# Patient Record
Sex: FEMALE | Race: WHITE | Employment: OTHER | URBAN - METROPOLITAN AREA
[De-identification: names, ages, dates, MRNs, and addresses within clinical notes are randomized per-mention and may not be internally consistent; named-entity substitution may affect disease eponyms.]

---

## 2017-11-14 ENCOUNTER — APPOINTMENT (OUTPATIENT)
Dept: LAB | Facility: CLINIC | Age: 67
End: 2017-11-14
Attending: INTERNAL MEDICINE
Payer: MEDICARE

## 2017-11-14 ENCOUNTER — ONCOLOGY VISIT (OUTPATIENT)
Dept: TRANSPLANT | Facility: CLINIC | Age: 67
End: 2017-11-14
Attending: INTERNAL MEDICINE
Payer: MEDICARE

## 2017-11-14 VITALS
DIASTOLIC BLOOD PRESSURE: 85 MMHG | RESPIRATION RATE: 16 BRPM | HEIGHT: 65 IN | SYSTOLIC BLOOD PRESSURE: 135 MMHG | WEIGHT: 145.8 LBS | TEMPERATURE: 98.2 F | OXYGEN SATURATION: 98 % | BODY MASS INDEX: 24.29 KG/M2 | HEART RATE: 97 BPM

## 2017-11-14 DIAGNOSIS — E03.8 OTHER SPECIFIED HYPOTHYROIDISM: ICD-10-CM

## 2017-11-14 DIAGNOSIS — D80.1 HYPOGAMMAGLOBULINEMIA (H): ICD-10-CM

## 2017-11-14 DIAGNOSIS — C85.99 EXTRANODAL LYMPHOMA (H): ICD-10-CM

## 2017-11-14 DIAGNOSIS — Z94.81 S/P BONE MARROW TRANSPLANT (H): ICD-10-CM

## 2017-11-14 LAB
BASOPHILS # BLD AUTO: 0 10E9/L (ref 0–0.2)
BASOPHILS NFR BLD AUTO: 0.5 %
CRP SERPL-MCNC: 11.6 MG/L (ref 0–8)
DIFFERENTIAL METHOD BLD: NORMAL
EOSINOPHIL # BLD AUTO: 0.1 10E9/L (ref 0–0.7)
EOSINOPHIL NFR BLD AUTO: 1.7 %
ERYTHROCYTE [DISTWIDTH] IN BLOOD BY AUTOMATED COUNT: 13.1 % (ref 10–15)
HCT VFR BLD AUTO: 41.6 % (ref 35–47)
HGB BLD-MCNC: 13.4 G/DL (ref 11.7–15.7)
IGA SERPL-MCNC: <7 MG/DL (ref 70–380)
IGG SERPL-MCNC: 716 MG/DL (ref 695–1620)
IGM SERPL-MCNC: 133 MG/DL (ref 60–265)
IMM GRANULOCYTES # BLD: 0 10E9/L (ref 0–0.4)
IMM GRANULOCYTES NFR BLD: 0.5 %
LDH SERPL L TO P-CCNC: 181 U/L (ref 81–234)
LYMPHOCYTES # BLD AUTO: 2.2 10E9/L (ref 0.8–5.3)
LYMPHOCYTES NFR BLD AUTO: 29.7 %
MCH RBC QN AUTO: 30 PG (ref 26.5–33)
MCHC RBC AUTO-ENTMCNC: 32.2 G/DL (ref 31.5–36.5)
MCV RBC AUTO: 93 FL (ref 78–100)
MONOCYTES # BLD AUTO: 0.5 10E9/L (ref 0–1.3)
MONOCYTES NFR BLD AUTO: 6.5 %
NEUTROPHILS # BLD AUTO: 4.6 10E9/L (ref 1.6–8.3)
NEUTROPHILS NFR BLD AUTO: 61.1 %
NRBC # BLD AUTO: 0 10*3/UL
NRBC BLD AUTO-RTO: 0 /100
PLATELET # BLD AUTO: 251 10E9/L (ref 150–450)
RBC # BLD AUTO: 4.46 10E12/L (ref 3.8–5.2)
TSH SERPL DL<=0.005 MIU/L-ACNC: 1.67 MU/L (ref 0.4–4)
WBC # BLD AUTO: 7.5 10E9/L (ref 4–11)

## 2017-11-14 PROCEDURE — 82784 ASSAY IGA/IGD/IGG/IGM EACH: CPT | Performed by: INTERNAL MEDICINE

## 2017-11-14 PROCEDURE — 86140 C-REACTIVE PROTEIN: CPT | Performed by: INTERNAL MEDICINE

## 2017-11-14 PROCEDURE — 84165 PROTEIN E-PHORESIS SERUM: CPT | Performed by: INTERNAL MEDICINE

## 2017-11-14 PROCEDURE — 99212 OFFICE O/P EST SF 10 MIN: CPT | Mod: ZF

## 2017-11-14 PROCEDURE — 36415 COLL VENOUS BLD VENIPUNCTURE: CPT

## 2017-11-14 PROCEDURE — 83615 LACTATE (LD) (LDH) ENZYME: CPT | Performed by: INTERNAL MEDICINE

## 2017-11-14 PROCEDURE — 84443 ASSAY THYROID STIM HORMONE: CPT | Performed by: INTERNAL MEDICINE

## 2017-11-14 PROCEDURE — 85025 COMPLETE CBC W/AUTO DIFF WBC: CPT | Performed by: INTERNAL MEDICINE

## 2017-11-14 PROCEDURE — 00000402 ZZHCL STATISTIC TOTAL PROTEIN: Performed by: INTERNAL MEDICINE

## 2017-11-14 RX ORDER — LORAZEPAM 0.5 MG/1
0.5 TABLET ORAL EVERY 6 HOURS PRN
Qty: 30 TABLET | Refills: 0 | Status: SHIPPED | OUTPATIENT
Start: 2017-11-14 | End: 2018-11-13

## 2017-11-14 RX ORDER — LEVOTHYROXINE SODIUM 75 UG/1
75 TABLET ORAL
COMMUNITY
Start: 2017-02-04 | End: 2018-02-04

## 2017-11-14 RX ORDER — ZOLEDRONIC ACID 5 MG/100ML
5 INJECTION, SOLUTION INTRAVENOUS
COMMUNITY
Start: 2017-02-03

## 2017-11-14 ASSESSMENT — PAIN SCALES - GENERAL: PAINLEVEL: NO PAIN (0)

## 2017-11-14 NOTE — LETTER
"11/14/2017       RE: Marisa Grimes  [address] [facility] [id number]     Dear Colleague,    Thank you for referring your patient, Marisa Grimes, to the [facility] BLOOD AND MARROW TRANSPLANT at [facility]. Please see a copy of my visit note below.    BONE MARROW TRANSPLANT VISIT      Marisa returns to the [facility]plant Clinic for evaluation of diffuse large B-cell lymphoma, now 9-1/2 years following an autologous peripheral blood stem cell transplant.  She is doing very well.  Has had fewer sinus problems.Very few infections. Recently had URI after flying from [place]. Now after 2 weeks sx are improving Les sinus congestion less greenish material.Feel tired today  Did get a flu shot this fall Continues on levothyroxine 75mcg/d  Did start Reclast last February and takes Vit D/Calcium Has some arthritis in right foot  Has noted less stamina  And more easily fatigued  She did have YAG  Laser right ey post cataract surgery    PAST MEDICAL HISTORY:  See my note from 11/2016 .      SOCIAL HISTORY:  See my note from 11/2016.      FAMILY HISTORY:  See my note from 11/2016.      REVIEW OF SYSTEMS:  A 12-point review of systems is done and is negative, except as in the HPI.      PHYSICAL EXAMINATION:   GENERAL:  She is an alert woman in no acute distress.   VITAL SIGNS:  Stable. /85  Pulse 97  Temp 98.2  F (36.8  C) (Oral)  Resp 16  Ht 1.651 m (5' 5\")  Wt 66.1 kg (145 lb 12.8 oz)  SpO2 98%  BMI 24.26 kg/m2    HEENT:  Normocephalic.  EOM okay, no scleral icterus.  Mouth without lesion.  No cataracts  RESPIRATORY:  Clear to percussion and auscultation.   LYMPH:  No cervical, inguinal or axillary adenopathy noted.   CARDIAC:  Normal sinus rhythm, no S3, S4 or murmur.   ABDOMEN:  Soft without hepatosplenomegaly.   EXTREMITIES:  Without edema.   NEUROLOGIC:  Decreased reflexes in the knees.     Results for MARISA GRIMES (MRN [id number]) as of " 11/14/2017 17:39   Ref. Range 11/14/2017 10:59   CRP Inflammation Latest Ref Range: 0.0 - 8.0 mg/L 11.6 (H)   Lactate Dehydrogenase Latest Ref Range: 81 - 234 U/L 181   TSH Latest Ref Range: 0.40 - 4.00 mU/L 1.67   WBC Latest Ref Range: 4.0 - 11.0 10e9/L 7.5   Hemoglobin Latest Ref Range: 11.7 - 15.7 g/dL 13.4   Hematocrit Latest Ref Range: 35.0 - 47.0 % 41.6   Platelet Count Latest Ref Range: 150 - 450 10e9/L 251   RBC Count Latest Ref Range: 3.8 - 5.2 10e12/L 4.46   MCV Latest Ref Range: 78 - 100 fl 93   MCH Latest Ref Range: 26.5 - 33.0 pg 30.0   MCHC Latest Ref Range: 31.5 - 36.5 g/dL 32.2   RDW Latest Ref Range: 10.0 - 15.0 % 13.1   Diff Method Unknown Automated Method   % Neutrophils Latest Units: % 61.1   % Lymphocytes Latest Units: % 29.7   % Monocytes Latest Units: % 6.5   % Eosinophils Latest Units: % 1.7   % Basophils Latest Units: % 0.5   % Immature Granulocytes Latest Units: % 0.5   Nucleated RBCs Latest Ref Range: 0 /100 0   Absolute Neutrophil Latest Ref Range: 1.6 - 8.3 10e9/L 4.6   Absolute Lymphocytes Latest Ref Range: 0.8 - 5.3 10e9/L 2.2   Absolute Monocytes Latest Ref Range: 0.0 - 1.3 10e9/L 0.5   Absolute Eosinophils Latest Ref Range: 0.0 - 0.7 10e9/L 0.1   Absolute Basophils Latest Ref Range: 0.0 - 0.2 10e9/L 0.0   Abs Immature Granulocytes Latest Ref Range: 0 - 0.4 10e9/L 0.0   Absolute Nucleated RBC Unknown 0.0   Albumin Fraction Latest Ref Range: 3.7 - 5.1 g/dL PENDING   Alpha 1 Fraction Latest Ref Range: 0.2 - 0.4 g/dL PENDING   Alpha 2 Fraction Latest Ref Range: 0.5 - 0.9 g/dL PENDING   Beta Fraction Latest Ref Range: 0.6 - 1.0 g/dL PENDING   ELP Interpretation: Unknown PENDING   Gamma Fraction Latest Ref Range: 0.7 - 1.6 g/dL PENDING   IGA Latest Ref Range: 70 - 380 mg/dL <7 (L)   IGG Latest Ref Range: 695 - 1620 mg/dL 716   IGM Latest Ref Range: 60 - 265 mg/dL 133   Monoclonal Peak Latest Ref Range: 0.0 g/dL PENDING       ASSESSMENT:   1.  Diffuse large B-cell non-Hodgkin's lymphoma.    2.  Status post autologous peripheral blood stem cell transplant.   3.  Status post spine surgery.   4.  Status post radiation.   5.  Chronic sinusitis.     6.  Bilateral cataracts s/p surgery  7.  Hypogammaglobulinemia  8   Hypothyroidism     Marisa is doing well 9-1/2 years following an autologous peripheral blood stem cell transplant and remains in complete remission and Karnofsky 100. Her sinusitus is better now and last year's IgG was 598, this year 719 .No need for IgG replacement now. TSH OK at 1.61  . She did get a flu shot this fall. Glad she is on bisphosphonates, would repeat reclast annually. .  I will see her again in 1 year's time.  Again, I suggested if she should have a sinus infection that early antibiotics be used.         Again, thank you for allowing me to participate in the care of your patient.      Sincerely,    Jeff Brantley MD

## 2017-11-14 NOTE — PROGRESS NOTES
"BONE MARROW TRANSPLANT VISIT      Marisa returns to the [facility] Mar[facility] Transplant Clinic for evaluation of diffuse large B-cell lymphoma, now 9-1/2 years following an autologous peripheral blood stem cell transplant.  She is doing very well.  Has had fewer sinus problems.Very few infections. Recently had URI after flying from [place]. Now after 2 weeks sx are improving Les sinus congestion less greenish material.Feel tired today  Did get a flu shot this fall Continues on levothyroxine 75mcg/d  Did start Reclast last February and takes Vit D/Calcium Has some arthritis in right foot  Has noted less stamina  And more easily fatigued  She did have YAG  Laser right ey post cataract surgery    PAST MEDICAL HISTORY:  See my note from 11/2016 .      SOCIAL HISTORY:  See my note from 11/2016.      FAMILY HISTORY:  See my note from 11/2016.      REVIEW OF SYSTEMS:  A 12-point review of systems is done and is negative, except as in the HPI.      PHYSICAL EXAMINATION:   GENERAL:  She is an alert woman in no acute distress.   VITAL SIGNS:  Stable. /85  Pulse 97  Temp 98.2  F (36.8  C) (Oral)  Resp 16  Ht 1.651 m (5' 5\")  Wt 66.1 kg (145 lb 12.8 oz)  SpO2 98%  BMI 24.26 kg/m2    HEENT:  Normocephalic.  EOM okay, no scleral icterus.  Mouth without lesion.  No cataracts  RESPIRATORY:  Clear to percussion and auscultation.   LYMPH:  No cervical, inguinal or axillary adenopathy noted.   CARDIAC:  Normal sinus rhythm, no S3, S4 or murmur.   ABDOMEN:  Soft without hepatosplenomegaly.   EXTREMITIES:  Without edema.   NEUROLOGIC:  Decreased reflexes in the knees.     Results for MARISA GRIMES (MRN [id number]) as of 11/14/2017 17:39   Ref. Range 11/14/2017 10:59   CRP Inflammation Latest Ref Range: 0.0 - 8.0 mg/L 11.6 (H)   Lactate Dehydrogenase Latest Ref Range: 81 - 234 U/L 181   TSH Latest Ref Range: 0.40 - 4.00 mU/L 1.67   WBC Latest Ref Range: 4.0 - 11.0 10e9/L 7.5   Hemoglobin Latest Ref Range: 11.7 - 15.7 g/dL 13.4 "   Hematocrit Latest Ref Range: 35.0 - 47.0 % 41.6   Platelet Count Latest Ref Range: 150 - 450 10e9/L 251   RBC Count Latest Ref Range: 3.8 - 5.2 10e12/L 4.46   MCV Latest Ref Range: 78 - 100 fl 93   MCH Latest Ref Range: 26.5 - 33.0 pg 30.0   MCHC Latest Ref Range: 31.5 - 36.5 g/dL 32.2   RDW Latest Ref Range: 10.0 - 15.0 % 13.1   Diff Method Unknown Automated Method   % Neutrophils Latest Units: % 61.1   % Lymphocytes Latest Units: % 29.7   % Monocytes Latest Units: % 6.5   % Eosinophils Latest Units: % 1.7   % Basophils Latest Units: % 0.5   % Immature Granulocytes Latest Units: % 0.5   Nucleated RBCs Latest Ref Range: 0 /100 0   Absolute Neutrophil Latest Ref Range: 1.6 - 8.3 10e9/L 4.6   Absolute Lymphocytes Latest Ref Range: 0.8 - 5.3 10e9/L 2.2   Absolute Monocytes Latest Ref Range: 0.0 - 1.3 10e9/L 0.5   Absolute Eosinophils Latest Ref Range: 0.0 - 0.7 10e9/L 0.1   Absolute Basophils Latest Ref Range: 0.0 - 0.2 10e9/L 0.0   Abs Immature Granulocytes Latest Ref Range: 0 - 0.4 10e9/L 0.0   Absolute Nucleated RBC Unknown 0.0   Albumin Fraction Latest Ref Range: 3.7 - 5.1 g/dL PENDING   Alpha 1 Fraction Latest Ref Range: 0.2 - 0.4 g/dL PENDING   Alpha 2 Fraction Latest Ref Range: 0.5 - 0.9 g/dL PENDING   Beta Fraction Latest Ref Range: 0.6 - 1.0 g/dL PENDING   ELP Interpretation: Unknown PENDING   Gamma Fraction Latest Ref Range: 0.7 - 1.6 g/dL PENDING   IGA Latest Ref Range: 70 - 380 mg/dL <7 (L)   IGG Latest Ref Range: 695 - 1620 mg/dL 716   IGM Latest Ref Range: 60 - 265 mg/dL 133   Monoclonal Peak Latest Ref Range: 0.0 g/dL PENDING       ASSESSMENT:   1.  Diffuse large B-cell non-Hodgkin's lymphoma.   2.  Status post autologous peripheral blood stem cell transplant.   3.  Status post spine surgery.   4.  Status post radiation.   5.  Chronic sinusitis.     6.  Bilateral cataracts s/p surgery  7.  Hypogammaglobulinemia  8   Hypothyroidism     Marisa is doing well 9-1/2 years following an autologous peripheral  blood stem cell transplant and remains in complete remission and Karnofsky 100. Her sinusitus is better now and last year's IgG was 598, this year 719 .No need for IgG replacement now. TSH OK at 1.61  . She did get a flu shot this fall. Glad she is on bisphosphonates, would repeat reclast annually. .  I will see her again in 1 year's time.  Again, I suggested if she should have a sinus infection that early antibiotics be used.

## 2017-11-14 NOTE — NURSING NOTE
"Oncology Rooming Note    November 14, 2017 11:31 AM   Marisa Byrd is a 67 year old female who presents for:    Chief Complaint   Patient presents with     Blood Draw     Labs drawn from left arm in lab by MA     Oncology Clinic Visit     Patient with Extranodal Lymphoma here for provider visit and lab review      Initial Vitals: /85  Pulse 97  Temp 98.2  F (36.8  C) (Oral)  Resp 16  Ht 1.651 m (5' 5\")  Wt 66.1 kg (145 lb 12.8 oz)  SpO2 98%  BMI 24.26 kg/m2 Estimated body mass index is 24.26 kg/(m^2) as calculated from the following:    Height as of this encounter: 1.651 m (5' 5\").    Weight as of this encounter: 66.1 kg (145 lb 12.8 oz). Body surface area is 1.74 meters squared.  No Pain (0) Comment: Data Unavailable   No LMP recorded. Patient is postmenopausal.  Allergies reviewed: Yes  Medications reviewed: Yes    Medications: Medication refills not needed today.  Pharmacy name entered into [employer]: [id number] - [place], [place] - [address]    Clinical concerns:     5 minutes for nursing intake (face to face time)     Kae Lynn CMA              "

## 2017-11-14 NOTE — MR AVS SNAPSHOT
After Visit Summary   11/14/2017    Marisa Byrd    MRN: [id number]           Patient Information     Date Of Birth          1950        Visit Information        Provider Department      11/14/2017 11:30 AM Jeff Brantley MD [facility] Blood and Marrow Transplant        Today's Diagnoses     Extranodal lymphoma (H)        Hypogammaglobulinemia (H)        S/P bone marrow transplant (H)        Other specified hypothyroidism              [facility] [place] [place]  [address] [place] [address]  Phone: [phone]  Clinic Hours:   Monday-Thursday:7am to 7pm   Friday: 7am to 5pm   Weekends and holidays:    8am to noon (in general)  If your fever is 100.5  or greater,   call the clinic.  After hours call the   hospital at [phone] or   [phone]. Ask for the BMT   fellow on-call            Follow-ups after your visit        Follow-up notes from your care team     Return in about 1 year (around 11/14/2018).      Your next 10 appointments already scheduled     Nov 13, 2018 11:00 AM CST   [facility] with  [facility]   [facility] ([facility])    [address] [place] [id number] [phone]            Nov 13, 2018 11:30 AM CST   Return with Jeff Brantley MD   [facility] Blood and Marrow Transplant ([facility])    [address] [place] [id number] [phone]              Future tests that were ordered for you today     Open Future Orders        Priority Expected Expires Ordered    Comprehensive metabolic panel Routine 10/14/2018 11/7/2018 11/14/2017    CBC with platelets differential Routine 10/14/2018 11/7/2018 11/14/2017    IgG Routine 10/14/2018 11/7/2018 11/14/2017    IgM Routine 10/14/2018 11/7/2018 11/14/2017    IgE Routine 10/14/2018 11/7/2018 11/14/2017    IgA Routine 10/14/2018 11/7/2018 11/14/2017    Protein  "electrophoresis Routine 10/14/2018 11/7/2018 11/14/2017    Lactate Dehydrogenase Routine 10/14/2018 11/7/2018 11/14/2017    CRP inflammation Routine 10/14/2018 11/7/2018 11/14/2017            Who to contact     If you have questions or need follow up information about today's clinic visit or your schedule please contact [facility] BLOOD AND MARROW TRANSPLANT directly at [phone].  Normal or non-critical lab and imaging results will be communicated to you by tocariohart, letter or phone within 4 business days after the clinic has received the results. If you do not hear from us within 7 days, please contact the clinic through mygall or phone. If you have a critical or abnormal lab result, we will notify you by phone as soon as possible.  Submit refill requests through mygall or call your pharmacy and they will forward the refill request to us. Please allow 3 business days for your refill to be completed.          Additional Information About Your Visit        mygall Information     mygall gives you secure access to your electronic health record. If you see a primary care provider, you can also send messages to your care team and make appointments. If you have questions, please call your primary care clinic.  If you do not have a primary care provider, please call [phone] and they will assist you.        Care EveryWhere ID     This is your Care EveryWhere ID. This could be used by other organizations to access your [facility] medical records  [id number]        Your Vitals Were     Pulse Temperature Respirations Height Pulse Oximetry BMI (Body Mass Index)    97 98.2  F (36.8  C) (Oral) 16 1.651 m (5' 5\") 98% 24.26 kg/m2       Blood Pressure from Last 3 Encounters:   11/14/17 135/85   11/15/16 126/87   11/17/15 124/82    Weight from Last 3 Encounters:   11/14/17 66.1 kg (145 lb 12.8 oz)   11/15/16 63.8 kg (140 lb 11.2 oz)   11/17/15 67 kg (147 lb 11.3 oz)              We Performed the Following     CBC with " platelets differential     CRP inflammation     IgA     IgG     IgM     Lactate Dehydrogenase     Protein electrophoresis     TSH          Where to get your medicines      Some of these will need a paper prescription and others can be bought over the counter.  Ask your nurse if you have questions.     Bring a paper prescription for each of these medications     LORazepam 0.5 MG tablet          Recent Review Flowsheet Data     BMT Recent Results Latest Ref Rng & Units 6/11/2013 1/21/2014 4/30/2014 11/18/2014 11/17/2015 11/15/2016 11/14/2017    WBC 4.0 - 11.0 10e9/L 6.4 6.5 7.9 5.4 6.1 10.1 7.5    Hemoglobin 11.7 - 15.7 g/dL 13.5 13.3 14.2 13.5 14.9 15.1 13.4    Platelet Count 150 - 450 10e9/L 164 157 183 157 172 199 251    Neutrophils (Absolute) 1.6 - 8.3 10e9/L 4.1 4.5 5.0 2.9 2.7 6.5 4.6    INR 0.86 - 1.14 - - - - - - -    Sodium 133 - 144 mmol/L 138 142 142 141 141 144 -    Potassium 3.4 - 5.3 mmol/L 3.9 4.1 4.2 4.2 4.3 4.3 -    Chloride 94 - 109 mmol/L 104 107 104 110(H) 110(H) 109 -    Glucose 70 - 99 mg/dL 95 88 91 84 96 95 -    Urea Nitrogen 7 - 30 mg/dL 18 15 14 14 19 18 -    Creatinine 0.52 - 1.04 mg/dL 1.00 0.92 1.00 0.88 1.01 0.93 -    Calcium (Total) 8.5 - 10.1 mg/dL 9.8 9.6 9.7 10.1 9.6 9.7 -    Protein (Total) 6.8 - 8.8 g/dL 7.7 6.9 7.2 7.0 7.2 6.9 -    Albumin 3.4 - 5.0 g/dL 4.3 4.0 4.2 3.9 4.2 3.7 -    Alkaline Phosphatase 40 - 150 U/L 78 85 88 87 96 82 -    AST 0 - 45 U/L 33 27 22 16 18 16 -    ALT 0 - 50 U/L 26 30 30 28 29 23 -    MCV 78 - 100 fl 91 92 92 92 90 92 93               Primary Care Provider    Physician No Ref-Primary       NO REF-PRIMARY PHYSICIAN        Equal Access to Services     ARVIN RILEY : Jessica Jhaveri, jennifer cartwright, debra ramos, sina ervin. [facility] [phone].    ATENCIÓN: Si habla español, tiene a vaughn disposición servicios gratuitos de asistencia lingüística. Llame al [phone].    We comply with applicable  federal civil rights laws and [place] laws. We do not discriminate on the basis of race, color, national origin, age, disability, sex, sexual orientation, or gender identity.            Thank you!     Thank you for choosing [facility] BLOOD AND MARROW TRANSPLANT  for your care. Our goal is always to provide you with excellent care. Hearing back from our patients is one way we can continue to improve our services. Please take a few minutes to complete the written survey that you may receive in the mail after your visit with us. Thank you!             Your Updated Medication List - Protect others around you: Learn how to safely use, store and throw away your medicines at www.disposemymeds.org.          This list is accurate as of: 11/14/17 12:17 PM.  Always use your most recent med list.                   Brand Name Dispense Instructions for use Diagnosis    albuterol 108 (90 BASE) MCG/ACT Inhaler    PROAIR HFA/PROVENTIL HFA/VENTOLIN HFA     Inhale 2 puffs into the lungs every 4 hours as needed.        aspirin 81 MG tablet      Take 1 tablet by mouth daily.        calcium citrate-vitamin D 315-200 MG-UNIT Tabs per tablet    CITRACAL     Take 1 tablet by mouth daily.        fluticasone 50 MCG/ACT spray    FLONASE     Spray 2 sprays into both nostrils as needed.        guaiFENesin-codeine 100-10 MG/5ML Soln solution    ROBITUSSIN AC    240 mL    Take 10 mLs by mouth every 4 hours as needed    S/P bone marrow transplant (H)       levothyroxine 75 MCG tablet    SYNTHROID/LEVOTHROID     Take 75 mcg by mouth    Extranodal lymphoma (H), Hypogammaglobulinemia (H), S/P bone marrow transplant (H), Other specified hypothyroidism       LORazepam 0.5 MG tablet    ATIVAN    30 tablet    Take 1 tablet (0.5 mg) by mouth every 6 hours as needed for anxiety    Extranodal lymphoma (H), Hypogammaglobulinemia (H), S/P bone marrow transplant (H), Other specified hypothyroidism       simvastatin 10 MG tablet    ZOCOR     Take 1 tablet by  mouth daily.        zoledronic Acid 5 MG/100ML Soln infusion    RECLAST     Inject 5 mg into the vein    Extranodal lymphoma (H), Hypogammaglobulinemia (H), S/P bone marrow transplant (H), Other specified hypothyroidism

## 2017-11-14 NOTE — NURSING NOTE
Chief Complaint   Patient presents with     Blood Draw     Labs drawn from left arm in lab by MA     Pt tolerated well  Diana Figueroa MA

## 2017-11-15 LAB
ALBUMIN SERPL ELPH-MCNC: 3.9 G/DL (ref 3.7–5.1)
ALPHA1 GLOB SERPL ELPH-MCNC: 0.4 G/DL (ref 0.2–0.4)
ALPHA2 GLOB SERPL ELPH-MCNC: 0.9 G/DL (ref 0.5–0.9)
B-GLOBULIN SERPL ELPH-MCNC: 0.7 G/DL (ref 0.6–1)
GAMMA GLOB SERPL ELPH-MCNC: 0.8 G/DL (ref 0.7–1.6)
M PROTEIN SERPL ELPH-MCNC: 0 G/DL
PROT PATTERN SERPL ELPH-IMP: NORMAL

## 2017-11-17 ENCOUNTER — CARE COORDINATION (OUTPATIENT)
Dept: TRANSPLANT | Facility: CLINIC | Age: 67
End: 2017-11-17

## 2017-11-17 DIAGNOSIS — Z94.81 S/P BONE MARROW TRANSPLANT (H): ICD-10-CM

## 2017-11-17 DIAGNOSIS — J32.9 SINUSITIS: Primary | ICD-10-CM

## 2017-11-17 RX ORDER — LEVOFLOXACIN 500 MG/1
500 TABLET, FILM COATED ORAL DAILY
Qty: 7 TABLET | Refills: 0 | Status: SHIPPED | OUTPATIENT
Start: 2017-11-17

## 2017-11-17 NOTE — PROGRESS NOTES
I spoke with Marisa Byrd to relay her outstanding lab results from Tuesday's appointment. In that conversation, she requested an antibiotic as she is not clearing her URI and is still experiencing sinus congestion. This information was relayed to Dr. Brantley who saw her on Tuesday and he ordered an rx for Levaquin 500mg for 7 days. Patient was called back with this information who accurately verbalized back understanding of the prescription.

## 2018-03-20 ENCOUNTER — CARE COORDINATION (OUTPATIENT)
Dept: TRANSPLANT | Facility: CLINIC | Age: 68
End: 2018-03-20

## 2018-03-20 NOTE — PROGRESS NOTES
Patient called inquiring if it was okay to get Shingrix immunization. Per Dr. Brantley, I informed patient that it was okay. Patient verbalized back understanding.

## 2018-11-13 ENCOUNTER — APPOINTMENT (OUTPATIENT)
Dept: LAB | Facility: CLINIC | Age: 68
End: 2018-11-13
Attending: INTERNAL MEDICINE
Payer: MEDICARE

## 2018-11-13 ENCOUNTER — ONCOLOGY VISIT (OUTPATIENT)
Dept: TRANSPLANT | Facility: CLINIC | Age: 68
End: 2018-11-13
Attending: INTERNAL MEDICINE
Payer: MEDICARE

## 2018-11-13 VITALS
RESPIRATION RATE: 16 BRPM | WEIGHT: 142.9 LBS | TEMPERATURE: 97.5 F | DIASTOLIC BLOOD PRESSURE: 85 MMHG | SYSTOLIC BLOOD PRESSURE: 128 MMHG | OXYGEN SATURATION: 100 % | HEART RATE: 84 BPM | HEIGHT: 65 IN | BODY MASS INDEX: 23.81 KG/M2

## 2018-11-13 DIAGNOSIS — C85.99 EXTRANODAL LYMPHOMA (H): ICD-10-CM

## 2018-11-13 DIAGNOSIS — Z94.81 S/P BONE MARROW TRANSPLANT (H): ICD-10-CM

## 2018-11-13 DIAGNOSIS — D80.1 HYPOGAMMAGLOBULINEMIA (H): ICD-10-CM

## 2018-11-13 DIAGNOSIS — E03.8 OTHER SPECIFIED HYPOTHYROIDISM: ICD-10-CM

## 2018-11-13 LAB
ALBUMIN SERPL-MCNC: 3.9 G/DL (ref 3.4–5)
ALP SERPL-CCNC: 88 U/L (ref 40–150)
ALT SERPL W P-5'-P-CCNC: 23 U/L (ref 0–50)
ANION GAP SERPL CALCULATED.3IONS-SCNC: 5 MMOL/L (ref 3–14)
AST SERPL W P-5'-P-CCNC: 11 U/L (ref 0–45)
BASOPHILS # BLD AUTO: 0 10E9/L (ref 0–0.2)
BASOPHILS NFR BLD AUTO: 0.4 %
BILIRUB SERPL-MCNC: 0.4 MG/DL (ref 0.2–1.3)
BUN SERPL-MCNC: 24 MG/DL (ref 7–30)
CALCIUM SERPL-MCNC: 9.4 MG/DL (ref 8.5–10.1)
CHLORIDE SERPL-SCNC: 108 MMOL/L (ref 94–109)
CO2 SERPL-SCNC: 26 MMOL/L (ref 20–32)
CREAT SERPL-MCNC: 1.04 MG/DL (ref 0.52–1.04)
CRP SERPL-MCNC: 10 MG/L (ref 0–8)
DIFFERENTIAL METHOD BLD: ABNORMAL
EOSINOPHIL # BLD AUTO: 0.1 10E9/L (ref 0–0.7)
EOSINOPHIL NFR BLD AUTO: 1.1 %
ERYTHROCYTE [DISTWIDTH] IN BLOOD BY AUTOMATED COUNT: 13 % (ref 10–15)
GFR SERPL CREATININE-BSD FRML MDRD: 53 ML/MIN/1.7M2
GLUCOSE SERPL-MCNC: 96 MG/DL (ref 70–99)
HCT VFR BLD AUTO: 47.2 % (ref 35–47)
HGB BLD-MCNC: 15.1 G/DL (ref 11.7–15.7)
IGA SERPL-MCNC: <7 MG/DL (ref 70–380)
IGG SERPL-MCNC: 705 MG/DL (ref 695–1620)
IGM SERPL-MCNC: 90 MG/DL (ref 60–265)
IMM GRANULOCYTES # BLD: 0 10E9/L (ref 0–0.4)
IMM GRANULOCYTES NFR BLD: 0.4 %
LDH SERPL L TO P-CCNC: 172 U/L (ref 81–234)
LYMPHOCYTES # BLD AUTO: 2 10E9/L (ref 0.8–5.3)
LYMPHOCYTES NFR BLD AUTO: 21.8 %
MCH RBC QN AUTO: 29.7 PG (ref 26.5–33)
MCHC RBC AUTO-ENTMCNC: 32 G/DL (ref 31.5–36.5)
MCV RBC AUTO: 93 FL (ref 78–100)
MONOCYTES # BLD AUTO: 0.5 10E9/L (ref 0–1.3)
MONOCYTES NFR BLD AUTO: 5.2 %
NEUTROPHILS # BLD AUTO: 6.6 10E9/L (ref 1.6–8.3)
NEUTROPHILS NFR BLD AUTO: 71.1 %
NRBC # BLD AUTO: 0 10*3/UL
NRBC BLD AUTO-RTO: 0 /100
PLATELET # BLD AUTO: 224 10E9/L (ref 150–450)
POTASSIUM SERPL-SCNC: 4 MMOL/L (ref 3.4–5.3)
PROT SERPL-MCNC: 7.3 G/DL (ref 6.8–8.8)
RBC # BLD AUTO: 5.09 10E12/L (ref 3.8–5.2)
SODIUM SERPL-SCNC: 139 MMOL/L (ref 133–144)
WBC # BLD AUTO: 9.4 10E9/L (ref 4–11)

## 2018-11-13 PROCEDURE — 00000402 ZZHCL STATISTIC TOTAL PROTEIN: Performed by: INTERNAL MEDICINE

## 2018-11-13 PROCEDURE — 82785 ASSAY OF IGE: CPT | Performed by: INTERNAL MEDICINE

## 2018-11-13 PROCEDURE — 83615 LACTATE (LD) (LDH) ENZYME: CPT | Performed by: INTERNAL MEDICINE

## 2018-11-13 PROCEDURE — 85025 COMPLETE CBC W/AUTO DIFF WBC: CPT | Performed by: INTERNAL MEDICINE

## 2018-11-13 PROCEDURE — 82784 ASSAY IGA/IGD/IGG/IGM EACH: CPT | Performed by: INTERNAL MEDICINE

## 2018-11-13 PROCEDURE — 86140 C-REACTIVE PROTEIN: CPT | Performed by: INTERNAL MEDICINE

## 2018-11-13 PROCEDURE — 80053 COMPREHEN METABOLIC PANEL: CPT | Performed by: INTERNAL MEDICINE

## 2018-11-13 PROCEDURE — G0463 HOSPITAL OUTPT CLINIC VISIT: HCPCS

## 2018-11-13 PROCEDURE — 36415 COLL VENOUS BLD VENIPUNCTURE: CPT

## 2018-11-13 PROCEDURE — G0463 HOSPITAL OUTPT CLINIC VISIT: HCPCS | Mod: ZF

## 2018-11-13 PROCEDURE — 84165 PROTEIN E-PHORESIS SERUM: CPT | Performed by: INTERNAL MEDICINE

## 2018-11-13 RX ORDER — CODEINE PHOSPHATE AND GUAIFENESIN 10; 100 MG/5ML; MG/5ML
2 SOLUTION ORAL EVERY 4 HOURS PRN
Qty: 240 ML | Refills: 0 | Status: SHIPPED | OUTPATIENT
Start: 2018-11-13

## 2018-11-13 RX ORDER — LORAZEPAM 0.5 MG/1
0.5 TABLET ORAL EVERY 6 HOURS PRN
Qty: 30 TABLET | Refills: 0 | Status: SHIPPED | OUTPATIENT
Start: 2018-11-13 | End: 2020-11-20

## 2018-11-13 RX ORDER — LEVOTHYROXINE SODIUM 75 UG/1
75 TABLET ORAL
COMMUNITY
Start: 2017-12-18 | End: 2018-12-18

## 2018-11-13 ASSESSMENT — PAIN SCALES - GENERAL: PAINLEVEL: NO PAIN (0)

## 2018-11-13 NOTE — MR AVS SNAPSHOT
After Visit Summary   11/13/2018    Marisa Byrd    MRN: [id number]           Patient Information     Date Of Birth          1950        Visit Information        Provider Department      11/13/2018 11:30 AM Jeff Brantley MD [facility] Blood and Marrow Transplant        Today's Diagnoses     Extranodal lymphoma (H)        Hypogammaglobulinemia (H)        S/P bone marrow transplant (H)        Other specified hypothyroidism              [facility] ([facility])  [address] [place] [address]  Phone: [phone]  Clinic Hours:   Monday-Thursday:7am to 7pm   Friday: 7am to 5pm   Weekends and holidays:    8am to noon (in general)  If your fever is 100.5  or greater,   call the clinic.  After hours call the   hospital at [phone] or   [phone]. Ask for the BMT   fellow on-call            Follow-ups after your visit        Follow-up notes from your care team     Return in about 1 year (around 11/13/2019).      Future tests that were ordered for you today     Open Future Orders        Priority Expected Expires Ordered    CBC with platelets differential Routine 11/13/2019 11/13/2019 11/13/2018    Comprehensive metabolic panel Routine 11/13/2019 11/13/2019 11/13/2018    TSH Routine 11/13/2019 11/13/2019 11/13/2018    IgG Routine 11/13/2019 11/13/2019 11/13/2018    CRP inflammation Routine 11/13/2019 11/13/2019 11/13/2018    Lactate Dehydrogenase Routine 11/13/2019 11/13/2019 11/13/2018    Protein electrophoresis Routine 11/13/2019 11/13/2019 11/13/2018            Who to contact     If you have questions or need follow up information about today's clinic visit or your schedule please contact [facility] directly at [phone].  Normal or non-critical lab and imaging results will be communicated to you by MyChart, letter or phone within 4 business days after the clinic has received the results. If you do not hear from us within 7 days,  "please contact the clinic through [employer] or phone. If you have a critical or abnormal lab result, we will notify you by phone as soon as possible.  Submit refill requests through [employer] or call your pharmacy and they will forward the refill request to us. Please allow 3 business days for your refill to be completed.          Additional Information About Your Visit        [email or web address] Information     [employer] gives you secure access to your electronic health record. If you see a primary care provider, you can also send messages to your care team and make appointments. If you have questions, please call your primary care clinic.  If you do not have a primary care provider, please call [phone] and they will assist you.        Care EveryWhere ID     This is your Care EveryWhere ID. This could be used by other organizations to access your [facility] medical records  [id number]        Your Vitals Were     Pulse Temperature Respirations Height Pulse Oximetry BMI (Body Mass Index)    84 97.5  F (36.4  C) (Oral) 16 1.651 m (5' 5\") 100% 23.78 kg/m2       Blood Pressure from Last 3 Encounters:   11/13/18 128/85   11/14/17 135/85   11/15/16 126/87    Weight from Last 3 Encounters:   11/13/18 64.8 kg (142 lb 14.4 oz)   11/14/17 66.1 kg (145 lb 12.8 oz)   11/15/16 63.8 kg (140 lb 11.2 oz)              We Performed the Following     CBC with platelets differential     Comprehensive metabolic panel     CRP inflammation     IgA     IgE     IgG     IgM     Lactate Dehydrogenase     Protein electrophoresis          Where to get your medicines      Some of these will need a paper prescription and others can be bought over the counter.  Ask your nurse if you have questions.     Bring a paper prescription for each of these medications     guaiFENesin-codeine 100-10 MG/5ML Soln solution    LORazepam 0.5 MG tablet          Recent Review Flowsheet Data     BMT Recent Results Latest Ref Rng & Units 1/21/2014 4/30/2014 11/18/2014 11/17/2015 " 11/15/2016 11/14/2017 11/13/2018    WBC 4.0 - 11.0 10e9/L 6.5 7.9 5.4 6.1 10.1 7.5 9.4    Hemoglobin 11.7 - 15.7 g/dL 13.3 14.2 13.5 14.9 15.1 13.4 15.1    Platelet Count 150 - 450 10e9/L 157 183 157 172 199 251 224    Neutrophils (Absolute) 1.6 - 8.3 10e9/L 4.5 5.0 2.9 2.7 6.5 4.6 6.6    INR 0.86 - 1.14 - - - - - - -    Sodium 133 - 144 mmol/L 142 142 141 141 144 - 139    Potassium 3.4 - 5.3 mmol/L 4.1 4.2 4.2 4.3 4.3 - 4.0    Chloride 94 - 109 mmol/L 107 104 110(H) 110(H) 109 - 108    Glucose 70 - 99 mg/dL 88 91 84 96 95 - 96    Urea Nitrogen 7 - 30 mg/dL 15 14 14 19 18 - 24    Creatinine 0.52 - 1.04 mg/dL 0.92 1.00 0.88 1.01 0.93 - 1.04    Calcium (Total) 8.5 - 10.1 mg/dL 9.6 9.7 10.1 9.6 9.7 - 9.4    Protein (Total) 6.8 - 8.8 g/dL 6.9 7.2 7.0 7.2 6.9 - 7.3    Albumin 3.4 - 5.0 g/dL 4.0 4.2 3.9 4.2 3.7 - 3.9    Alkaline Phosphatase 40 - 150 U/L 85 88 87 96 82 - 88    AST 0 - 45 U/L 27 22 16 18 16 - 11    ALT 0 - 50 U/L 30 30 28 29 23 - 23    MCV 78 - 100 fl 92 92 92 90 92 93 93               Primary Care Provider Fax #    Physician No Ref-Primary [phone]       No address on file        Equal Access to Services     [facility] ROSEMARY AH: Hadii steven tenorioo Soivisali, waaxda luqadaha, qaybta kaalmada adeegyada, sina ervin. So [facility] [phone].    ATENCIÓN: Si habla ana maria, tiene a vaughn disposición servicios gratuitos de asistencia lingüística. Llame al [phone].    We comply with applicable federal civil rights laws and [place] laws. We do not discriminate on the basis of race, color, national origin, age, disability, sex, sexual orientation, or gender identity.            Thank you!     Thank you for choosing [facility]OD [facility] MARROW TRANSPLANT  for your care. Our goal is always to provide you with excellent care. Hearing back from our patients is one way we can continue to improve our services. Please take a few minutes to complete the written survey that you may receive in the  mail after your visit with us. Thank you!             Your Updated Medication List - Protect others around you: Learn how to safely use, store and throw away your medicines at www.disposemymeds.org.          This list is accurate as of 11/13/18 12:26 PM.  Always use your most recent med list.                   Brand Name Dispense Instructions for use Diagnosis    albuterol 108 (90 Base) MCG/ACT inhaler    PROAIR HFA/PROVENTIL HFA/VENTOLIN HFA     Inhale 2 puffs into the lungs every 4 hours as needed.        aspirin 81 MG tablet      Take 1 tablet by mouth daily.        calcium citrate-vitamin D 315-200 MG-UNIT Tabs per tablet    CITRACAL     Take 1 tablet by mouth daily.        fluticasone 50 MCG/ACT spray    FLONASE     Spray 2 sprays into both nostrils as needed.        guaiFENesin-codeine 100-10 MG/5ML Soln solution    ROBITUSSIN AC    240 mL    Take 10 mLs by mouth every 4 hours as needed    S/P bone marrow transplant (H), Extranodal lymphoma (H), Hypogammaglobulinemia (H), Other specified hypothyroidism       levofloxacin 500 MG tablet    LEVAQUIN    7 tablet    Take 1 tablet (500 mg) by mouth daily    Sinusitis, S/P bone marrow transplant (H)       levothyroxine 75 MCG tablet    SYNTHROID/LEVOTHROID     Take 75 mcg by mouth    Extranodal lymphoma (H), Hypogammaglobulinemia (H), S/P bone marrow transplant (H), Other specified hypothyroidism       LORazepam 0.5 MG tablet    ATIVAN    30 tablet    Take 1 tablet (0.5 mg) by mouth every 6 hours as needed for anxiety    Extranodal lymphoma (H), Hypogammaglobulinemia (H), S/P bone marrow transplant (H), Other specified hypothyroidism       simvastatin 10 MG tablet    ZOCOR     Take 1 tablet by mouth daily.        zoledronic Acid 5 MG/100ML Soln infusion    RECLAST     Inject 5 mg into the vein    Extranodal lymphoma (H), Hypogammaglobulinemia (H), S/P bone marrow transplant (H), Other specified hypothyroidism

## 2018-11-13 NOTE — PROGRESS NOTES
"BONE MARROW TRANSPLANT VISIT      Marisa returns to the Bone Marrow Transplant Clinic for evaluation of diffuse large B-cell lymphoma, now 10.5 years following an autologous peripheral blood stem cell transplant.  She is doing very well.  Did get a flu shot this fall Continues on levothyroxine 75mcg/d  Did start Reclast last February 2018 and takes Vit D/Calcium Has some arthritis in right foot with considerable pain. Will plan to have surgery on the foot Has some intermittent sinus trouble and dry cough        PAST MEDICAL HISTORY:  See my note from 11/2017 .      SOCIAL HISTORY:  See my note from 11/2017.      FAMILY HISTORY:  See my note from 11/2017.      REVIEW OF SYSTEMS:  A 12-point review of systems is done and is negative, except as in the HPI.      PHYSICAL EXAMINATION:   GENERAL:  She is an alert woman in no acute distress.   VITAL SIGNS:  Stable. /85 (BP Location: Right arm, Patient Position: Sitting, Cuff Size: Adult Regular)  Pulse 84  Temp 97.5  F (36.4  C) (Oral)  Resp 16  Ht 1.651 m (5' 5\")  Wt 64.8 kg (142 lb 14.4 oz)  SpO2 100%  BMI 23.78 kg/m2    HEENT:  Normocephalic.  EOM okay, no scleral icterus.  Mouth without lesion.  No cataracts  RESPIRATORY:  Clear to percussion and auscultation.   LYMPH:  No cervical, inguinal or axillary adenopathy noted.   CARDIAC:  Normal sinus rhythm, no S3, S4 or murmur.   ABDOMEN:  Soft without hepatosplenomegaly.   EXTREMITIES:  Without edema.   NEUROLOGIC:  Decreased reflexes in the knees.   R FOOT Dorsum of foot sl swollen    Results for MARISA GRIMES (MRN [id number]) as of 11/13/2018 21:50   Ref. Range 11/13/2018 11:11   Sodium Latest Ref Range: 133 - 144 mmol/L 139   Potassium Latest Ref Range: 3.4 - 5.3 mmol/L 4.0   Chloride Latest Ref Range: 94 - 109 mmol/L 108   Carbon Dioxide Latest Ref Range: 20 - 32 mmol/L 26   Urea Nitrogen Latest Ref Range: 7 - 30 mg/dL 24   Creatinine Latest Ref Range: 0.52 - 1.04 mg/dL 1.04   GFR Estimate Latest Ref " Range: >60 mL/min/1.7m2 53 (L)   GFR Estimate If Black Latest Ref Range: >60 mL/min/1.7m2 64   Calcium Latest Ref Range: 8.5 - 10.1 mg/dL 9.4   Anion Gap Latest Ref Range: 3 - 14 mmol/L 5   Albumin Latest Ref Range: 3.4 - 5.0 g/dL 3.9   Protein Total Latest Ref Range: 6.8 - 8.8 g/dL 7.3   Bilirubin Total Latest Ref Range: 0.2 - 1.3 mg/dL 0.4   Alkaline Phosphatase Latest Ref Range: 40 - 150 U/L 88   ALT Latest Ref Range: 0 - 50 U/L 23   AST Latest Ref Range: 0 - 45 U/L 11   CRP Inflammation Latest Ref Range: 0.0 - 8.0 mg/L 10.0 (H)   Lactate Dehydrogenase Latest Ref Range: 81 - 234 U/L 172   Glucose Latest Ref Range: 70 - 99 mg/dL 96   WBC Latest Ref Range: 4.0 - 11.0 10e9/L 9.4   Hemoglobin Latest Ref Range: 11.7 - 15.7 g/dL 15.1   Hematocrit Latest Ref Range: 35.0 - 47.0 % 47.2 (H)   Platelet Count Latest Ref Range: 150 - 450 10e9/L 224   RBC Count Latest Ref Range: 3.8 - 5.2 10e12/L 5.09   MCV Latest Ref Range: 78 - 100 fl 93   MCH Latest Ref Range: 26.5 - 33.0 pg 29.7   MCHC Latest Ref Range: 31.5 - 36.5 g/dL 32.0   RDW Latest Ref Range: 10.0 - 15.0 % 13.0   Diff Method Unknown Automated Method   % Neutrophils Latest Units: % 71.1   % Lymphocytes Latest Units: % 21.8   % Monocytes Latest Units: % 5.2   % Eosinophils Latest Units: % 1.1   % Basophils Latest Units: % 0.4   % Immature Granulocytes Latest Units: % 0.4   Nucleated RBCs Latest Ref Range: 0 /100 0   Absolute Neutrophil Latest Ref Range: 1.6 - 8.3 10e9/L 6.6   Absolute Lymphocytes Latest Ref Range: 0.8 - 5.3 10e9/L 2.0   Absolute Monocytes Latest Ref Range: 0.0 - 1.3 10e9/L 0.5   Absolute Eosinophils Latest Ref Range: 0.0 - 0.7 10e9/L 0.1   Absolute Basophils Latest Ref Range: 0.0 - 0.2 10e9/L 0.0   Abs Immature Granulocytes Latest Ref Range: 0 - 0.4 10e9/L 0.0   Absolute Nucleated RBC Unknown 0.0   Albumin Fraction Latest Ref Range: 3.7 - 5.1 g/dL PENDING   Alpha 1 Fraction Latest Ref Range: 0.2 - 0.4 g/dL PENDING   Alpha 2 Fraction Latest Ref Range:  0.5 - 0.9 g/dL PENDING   Beta Fraction Latest Ref Range: 0.6 - 1.0 g/dL PENDING   ELP Interpretation: Unknown PENDING   Gamma Fraction Latest Ref Range: 0.7 - 1.6 g/dL PENDING   IGA Latest Ref Range: 70 - 380 mg/dL <7 (L)   IGG Latest Ref Range: 695 - 1620 mg/dL 705   IGM Latest Ref Range: 60 - 265 mg/dL 90   Monoclonal Peak Latest Ref Range: 0.0 g/dL PENDING       ASSESSMENT:   1.  Diffuse large B-cell non-Hodgkin's lymphoma.   2.  Status post autologous peripheral blood stem cell transplant.   3.  Status post spine surgery.   4.  Status post radiation.   5.  Chronic sinusitis.     6.  Bilateral cataracts s/p surgery  7.  Hypogammaglobulinemia  8   Hypothyroidism  9.  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 Her sinusitus is better now and last year's IgG was  719 .No need for IgG replacement now. TSH OK at 1.61  . She did get a flu shot this fall. Glad she is on bisphosphonates, would repeat reclast annuallyWould check with ortho for timing of Reclast around the surgery. OK to get Shingrix. .  Pt interested in Survivorship clinic.After foot surgery will contact Alissa Arguello to set up appt.I will see her again in 1 year's time.  Again, I suggested if she should have a sinus infection that early antibiotics be used. She has a low IgA but has received platelets and IV IgG without complications IGG levels ok

## 2018-11-13 NOTE — NURSING NOTE
Chief Complaint   Patient presents with     Blood Draw     Labs drawn via VPT by RN. VS taken. Pt checked in for next appt     Labs collected from venipuncture by RN. Vitals taken. Checked in for appointment(s).    Maria E BOND RN PHN BSN  BMT/Oncology Lab

## 2018-11-15 LAB
ALBUMIN SERPL ELPH-MCNC: 4.6 G/DL (ref 3.7–5.1)
ALPHA1 GLOB SERPL ELPH-MCNC: 0.3 G/DL (ref 0.2–0.4)
ALPHA2 GLOB SERPL ELPH-MCNC: 0.7 G/DL (ref 0.5–0.9)
B-GLOBULIN SERPL ELPH-MCNC: 0.7 G/DL (ref 0.6–1)
GAMMA GLOB SERPL ELPH-MCNC: 0.7 G/DL (ref 0.7–1.6)
IGE SERPL-ACNC: <2 KIU/L (ref 0–114)
M PROTEIN SERPL ELPH-MCNC: 0 G/DL
PROT PATTERN SERPL ELPH-IMP: NORMAL

## 2019-10-01 ENCOUNTER — HEALTH MAINTENANCE LETTER (OUTPATIENT)
Age: 69
End: 2019-10-01

## 2019-11-19 ENCOUNTER — APPOINTMENT (OUTPATIENT)
Dept: LAB | Facility: CLINIC | Age: 69
End: 2019-11-19
Attending: INTERNAL MEDICINE
Payer: MEDICARE

## 2019-11-19 ENCOUNTER — ONCOLOGY VISIT (OUTPATIENT)
Dept: TRANSPLANT | Facility: CLINIC | Age: 69
End: 2019-11-19
Attending: INTERNAL MEDICINE
Payer: MEDICARE

## 2019-11-19 VITALS
TEMPERATURE: 98.5 F | HEART RATE: 85 BPM | BODY MASS INDEX: 24.96 KG/M2 | OXYGEN SATURATION: 98 % | SYSTOLIC BLOOD PRESSURE: 140 MMHG | RESPIRATION RATE: 16 BRPM | WEIGHT: 150 LBS | DIASTOLIC BLOOD PRESSURE: 79 MMHG

## 2019-11-19 DIAGNOSIS — C85.99 EXTRANODAL LYMPHOMA (H): ICD-10-CM

## 2019-11-19 DIAGNOSIS — E03.8 OTHER SPECIFIED HYPOTHYROIDISM: ICD-10-CM

## 2019-11-19 DIAGNOSIS — Z94.81 S/P BONE MARROW TRANSPLANT (H): ICD-10-CM

## 2019-11-19 DIAGNOSIS — D80.1 HYPOGAMMAGLOBULINEMIA (H): ICD-10-CM

## 2019-11-19 LAB
ALBUMIN SERPL-MCNC: 4.1 G/DL (ref 3.4–5)
ALP SERPL-CCNC: 84 U/L (ref 40–150)
ALT SERPL W P-5'-P-CCNC: 23 U/L (ref 0–50)
ANION GAP SERPL CALCULATED.3IONS-SCNC: 4 MMOL/L (ref 3–14)
AST SERPL W P-5'-P-CCNC: 16 U/L (ref 0–45)
BASOPHILS # BLD AUTO: 0.1 10E9/L (ref 0–0.2)
BASOPHILS NFR BLD AUTO: 0.7 %
BILIRUB SERPL-MCNC: 0.4 MG/DL (ref 0.2–1.3)
BUN SERPL-MCNC: 21 MG/DL (ref 7–30)
CALCIUM SERPL-MCNC: 9.9 MG/DL (ref 8.5–10.1)
CHLORIDE SERPL-SCNC: 108 MMOL/L (ref 94–109)
CO2 SERPL-SCNC: 26 MMOL/L (ref 20–32)
CREAT SERPL-MCNC: 0.96 MG/DL (ref 0.52–1.04)
CRP SERPL-MCNC: <2.9 MG/L (ref 0–8)
DIFFERENTIAL METHOD BLD: NORMAL
EOSINOPHIL # BLD AUTO: 0.1 10E9/L (ref 0–0.7)
EOSINOPHIL NFR BLD AUTO: 1.4 %
ERYTHROCYTE [DISTWIDTH] IN BLOOD BY AUTOMATED COUNT: 13.4 % (ref 10–15)
GFR SERPL CREATININE-BSD FRML MDRD: 60 ML/MIN/{1.73_M2}
GLUCOSE SERPL-MCNC: 94 MG/DL (ref 70–99)
HCT VFR BLD AUTO: 46.6 % (ref 35–47)
HGB BLD-MCNC: 15.1 G/DL (ref 11.7–15.7)
IGG SERPL-MCNC: 646 MG/DL (ref 695–1620)
IMM GRANULOCYTES # BLD: 0 10E9/L (ref 0–0.4)
IMM GRANULOCYTES NFR BLD: 0.2 %
LDH SERPL L TO P-CCNC: 168 U/L (ref 81–234)
LYMPHOCYTES # BLD AUTO: 1.9 10E9/L (ref 0.8–5.3)
LYMPHOCYTES NFR BLD AUTO: 23.8 %
MCH RBC QN AUTO: 30.1 PG (ref 26.5–33)
MCHC RBC AUTO-ENTMCNC: 32.4 G/DL (ref 31.5–36.5)
MCV RBC AUTO: 93 FL (ref 78–100)
MONOCYTES # BLD AUTO: 0.6 10E9/L (ref 0–1.3)
MONOCYTES NFR BLD AUTO: 7.1 %
NEUTROPHILS # BLD AUTO: 5.4 10E9/L (ref 1.6–8.3)
NEUTROPHILS NFR BLD AUTO: 66.8 %
NRBC # BLD AUTO: 0 10*3/UL
NRBC BLD AUTO-RTO: 0 /100
PLATELET # BLD AUTO: 215 10E9/L (ref 150–450)
POTASSIUM SERPL-SCNC: 4.1 MMOL/L (ref 3.4–5.3)
PROT SERPL-MCNC: 7.3 G/DL (ref 6.8–8.8)
RBC # BLD AUTO: 5.01 10E12/L (ref 3.8–5.2)
SODIUM SERPL-SCNC: 139 MMOL/L (ref 133–144)
TSH SERPL DL<=0.005 MIU/L-ACNC: 0.99 MU/L (ref 0.4–4)
WBC # BLD AUTO: 8 10E9/L (ref 4–11)

## 2019-11-19 PROCEDURE — 86140 C-REACTIVE PROTEIN: CPT | Performed by: INTERNAL MEDICINE

## 2019-11-19 PROCEDURE — 85025 COMPLETE CBC W/AUTO DIFF WBC: CPT | Performed by: INTERNAL MEDICINE

## 2019-11-19 PROCEDURE — G0463 HOSPITAL OUTPT CLINIC VISIT: HCPCS | Mod: ZF

## 2019-11-19 PROCEDURE — 00000402 ZZHCL STATISTIC TOTAL PROTEIN: Performed by: INTERNAL MEDICINE

## 2019-11-19 PROCEDURE — 80053 COMPREHEN METABOLIC PANEL: CPT | Performed by: INTERNAL MEDICINE

## 2019-11-19 PROCEDURE — 82784 ASSAY IGA/IGD/IGG/IGM EACH: CPT | Performed by: INTERNAL MEDICINE

## 2019-11-19 PROCEDURE — 84443 ASSAY THYROID STIM HORMONE: CPT | Performed by: INTERNAL MEDICINE

## 2019-11-19 PROCEDURE — 36415 COLL VENOUS BLD VENIPUNCTURE: CPT

## 2019-11-19 PROCEDURE — 84165 PROTEIN E-PHORESIS SERUM: CPT | Performed by: INTERNAL MEDICINE

## 2019-11-19 PROCEDURE — 83615 LACTATE (LD) (LDH) ENZYME: CPT | Performed by: INTERNAL MEDICINE

## 2019-11-19 RX ORDER — LEVOTHYROXINE SODIUM 75 UG/1
TABLET ORAL
Refills: 2 | COMMUNITY
Start: 2019-09-15

## 2019-11-19 ASSESSMENT — PAIN SCALES - GENERAL: PAINLEVEL: NO PAIN (0)

## 2019-11-19 NOTE — NURSING NOTE
"Oncology Rooming Note    November 19, 2019 11:27 AM   Marisa Byrd is a 69 year old female who presents for:    Chief Complaint   Patient presents with     Blood Draw     labs drawn with vpt by rn.  vs taken     RECHECK     Return: Extranodal lymphoma     Initial Vitals: BP (!) 140/79 (BP Location: Right arm, Patient Position: Sitting, Cuff Size: Adult Regular)   Pulse 85   Temp 98.5  F (36.9  C) (Oral)   Resp 16   Wt 68 kg (150 lb)   SpO2 98%   BMI 24.96 kg/m   Estimated body mass index is 24.96 kg/m  as calculated from the following:    Height as of 11/13/18: 1.651 m (5' 5\").    Weight as of this encounter: 68 kg (150 lb). Body surface area is 1.77 meters squared.  No Pain (0) Comment: Data Unavailable   No LMP recorded. Patient is postmenopausal.  Allergies reviewed: Yes  Medications reviewed: Yes    Medications: MEDICATION REFILLS NEEDED TODAY. Provider was notified.  Pharmacy name entered into [employer]: [id number] - [place], [place] - [address]    Clinical concerns: Refill of Lorazepam, and robitussin needed        Rhonda Garcia CMA              "

## 2019-11-19 NOTE — NURSING NOTE
Chief Complaint   Patient presents with     Blood Draw     labs drawn with vpt by rn.  vs taken     Labs drawn with vpt by rn.  Pt tolerated well.  VS taken.  Pt checked in for next appt.    Ruth Fowler RN

## 2019-11-19 NOTE — PROGRESS NOTES
BONE MARROW TRANSPLANT VISIT      Marisa returns to the Bone Marrow Transplant Clinic for evaluation of diffuse large B-cell lymphoma, now 11.5 years following an autologous peripheral blood stem cell transplant. She had foot surgery in Jan 2019 with fusion of several bone.Still has some pain  She is doing very well.  Did get a flu shot this fall Continues on levothyroxine 75mcg/d  Did get Reclast in February 2019 and takes Vit D/Calcium Note less stamina. Was evaluated for elevated calcium but no diagnosis for hyperparathyroidism. She decrease the amount of calcium PO and now Ca++ is OK      PAST MEDICAL HISTORY:  See my note from 11/2018 .      SOCIAL HISTORY:  See my note from 11/2018.      FAMILY HISTORY:  See my note from 11/2018.      REVIEW OF SYSTEMS:  A 12-point review of systems is done and is negative, except as in the HPI.      PHYSICAL EXAMINATION:   GENERAL:  She is an alert woman in no acute distress.   VITAL SIGNS:  Stable. BP (!) 140/79 (BP Location: Right arm, Patient Position: Sitting, Cuff Size: Adult Regular)   Pulse 85   Temp 98.5  F (36.9  C) (Oral)   Resp 16   Wt 68 kg (150 lb)   SpO2 98%   BMI 24.96 kg/m      HEENT:  Normocephalic.  EOM okay, no scleral icterus.  Mouth without lesion.  No cataracts  RESPIRATORY:  Clear to percussion and auscultation.   LYMPH:  No cervical, inguinal or axillary adenopathy noted.   CARDIAC:  Normal sinus rhythm, no S3, S4 or murmur.   ABDOMEN:  Soft without hepatosplenomegaly.   EXTREMITIES:  Without edema.   NEUROLOGIC:  +1 reflexes in the knees.   R FOOT Dorsum of foot sl swollen      ASSESSMENT:   1.  Diffuse large B-cell non-Hodgkin's lymphoma.   2.  Status post autologous peripheral blood stem cell transplant.   3.  Status post spine surgery.   4.  Status post radiation.   5.  Chronic sinusitis.     6.  Bilateral cataracts s/p surgery  7.  Hypogammaglobulinemia  8   Hypothyroidism  9.  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 Her sinusitus is better now and last year's IgG was  705 .No need for IgG replacement now.. She did get a flu shot this fall. Glad she is on bisphosphonates, would repeat reclast annually.I will see her again in 1 year's time.  Again, I suggested if she should have a sinus infection that early antibiotics be used. She has a low IgA but has received platelets and IV IgG without complications IGG levels ok    Results for MARISA GRIMES (MRN [id number]) as of 11/19/2019 11:51   Ref. Range 11/19/2019 10:54   Sodium Latest Ref Range: 133 - 144 mmol/L 139   Potassium Latest Ref Range: 3.4 - 5.3 mmol/L 4.1   Chloride Latest Ref Range: 94 - 109 mmol/L 108   Carbon Dioxide Latest Ref Range: 20 - 32 mmol/L 26   Urea Nitrogen Latest Ref Range: 7 - 30 mg/dL 21   Creatinine Latest Ref Range: 0.52 - 1.04 mg/dL 0.96   GFR Estimate Latest Ref Range: >60 mL/min/1.73_m2 60 (L)   GFR Estimate If Black Latest Ref Range: >60 mL/min/1.73_m2 69   Calcium Latest Ref Range: 8.5 - 10.1 mg/dL 9.9   Anion Gap Latest Ref Range: 3 - 14 mmol/L 4   Albumin Latest Ref Range: 3.4 - 5.0 g/dL 4.1   Protein Total Latest Ref Range: 6.8 - 8.8 g/dL 7.3   Bilirubin Total Latest Ref Range: 0.2 - 1.3 mg/dL 0.4   Alkaline Phosphatase Latest Ref Range: 40 - 150 U/L 84   ALT Latest Ref Range: 0 - 50 U/L 23   AST Latest Ref Range: 0 - 45 U/L 16   CRP Inflammation Latest Ref Range: 0.0 - 8.0 mg/L <2.9   Lactate Dehydrogenase Latest Ref Range: 81 - 234 U/L 168   TSH Latest Ref Range: 0.40 - 4.00 mU/L 0.99   Glucose Latest Ref Range: 70 - 99 mg/dL 94   WBC Latest Ref Range: 4.0 - 11.0 10e9/L 8.0   Hemoglobin Latest Ref Range: 11.7 - 15.7 g/dL 15.1   Hematocrit Latest Ref Range: 35.0 - 47.0 % 46.6   Platelet Count Latest Ref Range: 150 - 450 10e9/L 215   RBC Count Latest Ref Range: 3.8 - 5.2 10e12/L 5.01   MCV  Latest Ref Range: 78 - 100 fl 93   MCH Latest Ref Range: 26.5 - 33.0 pg 30.1   MCHC Latest Ref Range: 31.5 - 36.5 g/dL 32.4   RDW Latest Ref Range: 10.0 - 15.0 % 13.4   Diff Method Unknown Automated Method   % Neutrophils Latest Units: % 66.8   % Lymphocytes Latest Units: % 23.8   % Monocytes Latest Units: % 7.1   % Eosinophils Latest Units: % 1.4   % Basophils Latest Units: % 0.7   % Immature Granulocytes Latest Units: % 0.2   Nucleated RBCs Latest Ref Range: 0 /100 0   Absolute Neutrophil Latest Ref Range: 1.6 - 8.3 10e9/L 5.4   Absolute Lymphocytes Latest Ref Range: 0.8 - 5.3 10e9/L 1.9   Absolute Monocytes Latest Ref Range: 0.0 - 1.3 10e9/L 0.6   Absolute Eosinophils Latest Ref Range: 0.0 - 0.7 10e9/L 0.1   Absolute Basophils Latest Ref Range: 0.0 - 0.2 10e9/L 0.1   Abs Immature Granulocytes Latest Ref Range: 0 - 0.4 10e9/L 0.0   Absolute Nucleated RBC Unknown 0.0

## 2019-11-21 LAB
ALBUMIN SERPL ELPH-MCNC: 4.7 G/DL (ref 3.7–5.1)
ALPHA1 GLOB SERPL ELPH-MCNC: 0.3 G/DL (ref 0.2–0.4)
ALPHA2 GLOB SERPL ELPH-MCNC: 0.7 G/DL (ref 0.5–0.9)
B-GLOBULIN SERPL ELPH-MCNC: 0.6 G/DL (ref 0.6–1)
GAMMA GLOB SERPL ELPH-MCNC: 0.6 G/DL (ref 0.7–1.6)
M PROTEIN SERPL ELPH-MCNC: 0 G/DL
PROT PATTERN SERPL ELPH-IMP: ABNORMAL

## 2019-12-15 ENCOUNTER — HEALTH MAINTENANCE LETTER (OUTPATIENT)
Age: 69
End: 2019-12-15

## 2020-11-10 DIAGNOSIS — C85.99 EXTRANODAL LYMPHOMA (H): ICD-10-CM

## 2020-11-10 DIAGNOSIS — D89.89 OTHER SPECIFIED DISORDERS INVOLVING THE IMMUNE MECHANISM, NOT ELSEWHERE CLASSIFIED (H): ICD-10-CM

## 2020-11-10 DIAGNOSIS — Z94.81 S/P BONE MARROW TRANSPLANT (H): Primary | ICD-10-CM

## 2020-11-12 ENCOUNTER — HOSPITAL ENCOUNTER (OUTPATIENT)
Dept: LAB | Facility: CLINIC | Age: 70
Discharge: HOME | End: 2020-11-12
Attending: INTERNAL MEDICINE | Admitting: INTERNAL MEDICINE
Payer: MEDICARE

## 2020-11-12 DIAGNOSIS — D89.89 OTHER SPECIFIED DISORDERS INVOLVING THE IMMUNE MECHANISM, NOT ELSEWHERE CLASSIFIED (H): ICD-10-CM

## 2020-11-12 DIAGNOSIS — C85.99 EXTRANODAL LYMPHOMA (H): ICD-10-CM

## 2020-11-12 DIAGNOSIS — Z94.81 S/P BONE MARROW TRANSPLANT (H): ICD-10-CM

## 2020-11-12 LAB
ALBUMIN SERPL-MCNC: 3.9 G/DL (ref 3.4–5)
ALP SERPL-CCNC: 99 U/L (ref 40–150)
ALT SERPL W P-5'-P-CCNC: 25 U/L (ref 0–50)
ANION GAP SERPL CALCULATED.3IONS-SCNC: 4 MMOL/L (ref 3–14)
AST SERPL W P-5'-P-CCNC: 20 U/L (ref 0–45)
BASOPHILS # BLD AUTO: 0.1 10E9/L (ref 0–0.2)
BASOPHILS NFR BLD AUTO: 0.5 %
BILIRUB SERPL-MCNC: 0.3 MG/DL (ref 0.2–1.3)
BUN SERPL-MCNC: 18 MG/DL (ref 7–30)
CALCIUM SERPL-MCNC: 9.7 MG/DL (ref 8.5–10.1)
CHLORIDE SERPL-SCNC: 109 MMOL/L (ref 94–109)
CO2 SERPL-SCNC: 29 MMOL/L (ref 20–32)
CREAT SERPL-MCNC: 0.96 MG/DL (ref 0.52–1.04)
CRP SERPL-MCNC: <2.9 MG/L (ref 0–8)
DIFFERENTIAL METHOD BLD: ABNORMAL
EOSINOPHIL # BLD AUTO: 0.1 10E9/L (ref 0–0.7)
EOSINOPHIL NFR BLD AUTO: 1.5 %
ERYTHROCYTE [DISTWIDTH] IN BLOOD BY AUTOMATED COUNT: 13.8 % (ref 10–15)
GFR SERPL CREATININE-BSD FRML MDRD: 60 ML/MIN/{1.73_M2}
GLUCOSE SERPL-MCNC: 85 MG/DL (ref 70–99)
HCT VFR BLD AUTO: 48.9 % (ref 35–47)
HGB BLD-MCNC: 15.1 G/DL (ref 11.7–15.7)
IMM GRANULOCYTES # BLD: 0 10E9/L (ref 0–0.4)
IMM GRANULOCYTES NFR BLD: 0.4 %
LDH SERPL L TO P-CCNC: 188 U/L (ref 81–234)
LYMPHOCYTES # BLD AUTO: 3 10E9/L (ref 0.8–5.3)
LYMPHOCYTES NFR BLD AUTO: 30.8 %
MCH RBC QN AUTO: 29.9 PG (ref 26.5–33)
MCHC RBC AUTO-ENTMCNC: 30.9 G/DL (ref 31.5–36.5)
MCV RBC AUTO: 97 FL (ref 78–100)
MONOCYTES # BLD AUTO: 0.6 10E9/L (ref 0–1.3)
MONOCYTES NFR BLD AUTO: 6.5 %
NEUTROPHILS # BLD AUTO: 5.8 10E9/L (ref 1.6–8.3)
NEUTROPHILS NFR BLD AUTO: 60.3 %
NRBC # BLD AUTO: 0 10*3/UL
NRBC BLD AUTO-RTO: 0 /100
PLATELET # BLD AUTO: 214 10E9/L (ref 150–450)
POTASSIUM SERPL-SCNC: 4 MMOL/L (ref 3.4–5.3)
PROT SERPL-MCNC: 7.3 G/DL (ref 6.8–8.8)
RBC # BLD AUTO: 5.05 10E12/L (ref 3.8–5.2)
SODIUM SERPL-SCNC: 142 MMOL/L (ref 133–144)
TSH SERPL DL<=0.005 MIU/L-ACNC: 2.04 MU/L (ref 0.4–4)
WBC # BLD AUTO: 9.6 10E9/L (ref 4–11)

## 2020-11-12 PROCEDURE — 85025 COMPLETE CBC W/AUTO DIFF WBC: CPT | Performed by: INTERNAL MEDICINE

## 2020-11-12 PROCEDURE — 80053 COMPREHEN METABOLIC PANEL: CPT | Performed by: INTERNAL MEDICINE

## 2020-11-12 PROCEDURE — 999N001036 HC STATISTIC TOTAL PROTEIN: Performed by: INTERNAL MEDICINE

## 2020-11-12 PROCEDURE — 83615 LACTATE (LD) (LDH) ENZYME: CPT | Performed by: INTERNAL MEDICINE

## 2020-11-12 PROCEDURE — 84165 PROTEIN E-PHORESIS SERUM: CPT | Mod: 26 | Performed by: PATHOLOGY

## 2020-11-12 PROCEDURE — 86140 C-REACTIVE PROTEIN: CPT | Performed by: INTERNAL MEDICINE

## 2020-11-12 PROCEDURE — 84443 ASSAY THYROID STIM HORMONE: CPT | Performed by: INTERNAL MEDICINE

## 2020-11-12 PROCEDURE — 82784 ASSAY IGA/IGD/IGG/IGM EACH: CPT | Performed by: INTERNAL MEDICINE

## 2020-11-12 PROCEDURE — 84165 PROTEIN E-PHORESIS SERUM: CPT | Mod: TC | Performed by: INTERNAL MEDICINE

## 2020-11-12 PROCEDURE — 36415 COLL VENOUS BLD VENIPUNCTURE: CPT | Performed by: INTERNAL MEDICINE

## 2020-11-13 LAB
ALBUMIN SERPL ELPH-MCNC: 4.7 G/DL (ref 3.7–5.1)
ALPHA1 GLOB SERPL ELPH-MCNC: 0.3 G/DL (ref 0.2–0.4)
ALPHA2 GLOB SERPL ELPH-MCNC: 0.7 G/DL (ref 0.5–0.9)
B-GLOBULIN SERPL ELPH-MCNC: 0.6 G/DL (ref 0.6–1)
GAMMA GLOB SERPL ELPH-MCNC: 0.6 G/DL (ref 0.7–1.6)
IGG SERPL-MCNC: 624 MG/DL (ref 610–1616)
M PROTEIN SERPL ELPH-MCNC: 0 G/DL
PROT PATTERN SERPL ELPH-IMP: ABNORMAL

## 2020-11-17 ENCOUNTER — VIRTUAL VISIT (OUTPATIENT)
Dept: TRANSPLANT | Facility: CLINIC | Age: 70
End: 2020-11-17
Attending: INTERNAL MEDICINE
Payer: MEDICARE

## 2020-11-17 DIAGNOSIS — Z94.81 S/P BONE MARROW TRANSPLANT (H): Primary | ICD-10-CM

## 2020-11-17 DIAGNOSIS — C85.99 EXTRANODAL LYMPHOMA (H): ICD-10-CM

## 2020-11-17 PROCEDURE — 99213 OFFICE O/P EST LOW 20 MIN: CPT | Mod: 95 | Performed by: INTERNAL MEDICINE

## 2020-11-17 NOTE — LETTER
"    11/17/2020         RE: Marisa Byrd  [address] [facility] [address]        Dear Colleague,    Thank you for referring your patient, Marisa Byrd, to the [facility]OD [facility] M[facility] TRANSPLANT [facility]GRAM [place]. Please see a copy of my visit note below.    Marisa Byrd is a 70 year old female who is being evaluated via a billable video visit.      The patient has been notified of following:     \"This video visit will be conducted via a call between you and your physician/provider. We have found that certain health care needs can be provided without the need for an in-person physical exam.  This service lets us provide the care you need with a video conversation.  If a prescription is necessary we can send it directly to your pharmacy.  If lab work is needed we can place an order for that and you can then stop by our lab to have the test done at a later time.    Video visits are billed at different rates depending on your insurance coverage.  Please reach out to your insurance provider with any questions.    If during the course of the call the physician/provider feels a video visit is not appropriate, you will not be charged for this service.\"    Patient has given verbal consent for Video visit? Yes  How would you like to obtain your AVS? MyChart  If you are dropped from the video visit, the video invite should be resent to: Send to e-mail at: [email or web address]  Will anyone else be joining your video visit? No       MILAGROS Kim    BONE MARROW TRANSPLANT VISIT      Marisa [facility] to the [facility] for evaluation of diffuse large B-cell lymphoma, now 12.5 years following an autologous peripheral blood stem cell transplant. She had foot surgery in Jan 2019 with fusion of several bone.Still has some pain every once in while.  She is doing very well.  Did get a flu shot this fall Continues on levothyroxine 75mcg/d  Did get Reclast in February 2020 and " takes Vit D/Calcium  No new lymphadenopathy  No COVID sx no fever  No cough no loss of smell      PAST MEDICAL HISTORY:  See my note from 11/2019 .      SOCIAL HISTORY:  See my note from 11/2019.      FAMILY HISTORY:  See my note from 11/2019.      REVIEW OF SYSTEMS:  A 12-point review of systems is done and is negative, except as in the HPI.     PHYSICAL EXAMINATION:  By the video, she is an alert woman, verbal, in no acute distress.     EYES:  Grossly normal to inspection, no discharge, erythema or icterus.   SKIN:  Visible skin is clear.  No significant rash or abnormal pigmentation.   NEUROLOGIC:  Cranial nerves seem to be intact.  Mentation and speech is appropriate for age.   PSYCHIATRIC:  Mentation appears normal.  He does not seem anxious today.     Results for ALDA GRIMES (MRN [id number]) as of 11/17/2020 11:44   Ref. Range 11/12/2020 18:15   Sodium Latest Ref Range: 133 - 144 mmol/L 142   Potassium Latest Ref Range: 3.4 - 5.3 mmol/L 4.0   Chloride Latest Ref Range: 94 - 109 mmol/L 109   Carbon Dioxide Latest Ref Range: 20 - 32 mmol/L 29   Urea Nitrogen Latest Ref Range: 7 - 30 mg/dL 18   Creatinine Latest Ref Range: 0.52 - 1.04 mg/dL 0.96   GFR Estimate Latest Ref Range: >60 mL/min/1.73_m2 60 (L)   GFR Estimate If Black Latest Ref Range: >60 mL/min/1.73_m2 69   Calcium Latest Ref Range: 8.5 - 10.1 mg/dL 9.7   Anion Gap Latest Ref Range: 3 - 14 mmol/L 4   Albumin Latest Ref Range: 3.4 - 5.0 g/dL 3.9   Protein Total Latest Ref Range: 6.8 - 8.8 g/dL 7.3   Bilirubin Total Latest Ref Range: 0.2 - 1.3 mg/dL 0.3   Alkaline Phosphatase Latest Ref Range: 40 - 150 U/L 99   ALT Latest Ref Range: 0 - 50 U/L 25   AST Latest Ref Range: 0 - 45 U/L 20   CRP Inflammation Latest Ref Range: 0.0 - 8.0 mg/L <2.9   Lactate Dehydrogenase Latest Ref Range: 81 - 234 U/L 188   TSH Latest Ref Range: 0.40 - 4.00 mU/L 2.04   Glucose Latest Ref Range: 70 - 99 mg/dL 85   WBC Latest Ref Range: 4.0 - 11.0 10e9/L 9.6   Hemoglobin  Latest Ref Range: 11.7 - 15.7 g/dL 15.1   Hematocrit Latest Ref Range: 35.0 - 47.0 % 48.9 (H)   Platelet Count Latest Ref Range: 150 - 450 10e9/L 214   RBC Count Latest Ref Range: 3.8 - 5.2 10e12/L 5.05   MCV Latest Ref Range: 78 - 100 fl 97   MCH Latest Ref Range: 26.5 - 33.0 pg 29.9   MCHC Latest Ref Range: 31.5 - 36.5 g/dL 30.9 (L)   RDW Latest Ref Range: 10.0 - 15.0 % 13.8   Diff Method Unknown Automated Method   % Neutrophils Latest Units: % 60.3   % Lymphocytes Latest Units: % 30.8   % Monocytes Latest Units: % 6.5   % Eosinophils Latest Units: % 1.5   % Basophils Latest Units: % 0.5   % Immature Granulocytes Latest Units: % 0.4   Nucleated RBCs Latest Ref Range: 0 /100 0   Absolute Neutrophil Latest Ref Range: 1.6 - 8.3 10e9/L 5.8   Absolute Lymphocytes Latest Ref Range: 0.8 - 5.3 10e9/L 3.0   Absolute Monocytes Latest Ref Range: 0.0 - 1.3 10e9/L 0.6   Absolute Eosinophils Latest Ref Range: 0.0 - 0.7 10e9/L 0.1   Absolute Basophils Latest Ref Range: 0.0 - 0.2 10e9/L 0.1   Abs Immature Granulocytes Latest Ref Range: 0 - 0.4 10e9/L 0.0   Absolute Nucleated RBC Unknown 0.0   Albumin Fraction Latest Ref Range: 3.7 - 5.1 g/dL 4.7   Alpha 1 Fraction Latest Ref Range: 0.2 - 0.4 g/dL 0.3   Alpha 2 Fraction Latest Ref Range: 0.5 - 0.9 g/dL 0.7   Beta Fraction Latest Ref Range: 0.6 - 1.0 g/dL 0.6   ELP Interpretation: Unknown Essentially byron...   Gamma Fraction Latest Ref Range: 0.7 - 1.6 g/dL 0.6 (L)   IGG Latest Ref Range: 610 - 1,616 mg/dL 624   Monoclonal Peak Latest Ref Range: 0.0 g/dL 0.0       ASSESSMENT:   1.  Diffuse large B-cell non-Hodgkin's lymphoma.   2.  Status post autologous peripheral blood stem cell transplant.   3.  Status post spine surgery.   4.  Status post radiation.   5.  Chronic sinusitis.     6.  Bilateral cataracts s/p surgery  7.  Hypogammaglobulinemia  8   Hypothyroidism  9.  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 Her sinusitus is better now and this year's IgG was 624 .No need for IgG replacement now.. She did get a flu shot this fall. Glad she is on bisphosphonates, would repeat reclast annually.I will see her again in 1 year's time.  Again, I suggested if she should have a sinus infection that early antibiotics be used. She has a low IgA but has received platelets and IV IgG without complications The elevated hematocrit likely related to hydration    RTC 1 year with labs    Jeff Brantley MD  [phone]    Video-Visit Details    Type of service:  Video Visit    Video Start Time: 1135  Video End Time:  1155    Originating Location (pt. Location): Home    Distant Location (provider location):  [facility] BLOOD AND MARROW TRANSPLANT PROGRAM [place]     Platform used for Video Visit:  CHARLES    Again, thank you for allowing me to participate in the care of your patient.        Sincerely,        Jeff Brantley MD

## 2020-11-17 NOTE — PROGRESS NOTES
"Marisa Byrd is a 70 year old female who is being evaluated via a billable video visit.      The patient has been notified of following:     \"This video visit will be conducted via a call between you and your physician/provider. We have found that certain health care needs can be provided without the need for an in-person physical exam.  This service lets us provide the care you need with a video conversation.  If a prescription is necessary we can send it directly to your pharmacy.  If lab work is needed we can place an order for that and you can then stop by our lab to have the test done at a later time.    Video visits are billed at different rates depending on your insurance coverage.  Please reach out to your insurance provider with any questions.    If during the course of the call the physician/provider feels a video visit is not appropriate, you will not be charged for this service.\"    Patient has given verbal consent for Video visit? Yes  How would you like to obtain your AVS? MyChart  If you are dropped from the video visit, the video invite should be resent to: Send to e-mail at: [email or web address]  Will anyone else be joining your video visit? No       MILAGROS Kim    BONE MARROW TRANSPLANT VISIT      Marisa returns to the [facility] for evaluation of diffuse large B-cell lymphoma, now 12.5 years following an autologous peripheral blood stem cell transplant. She had foot surgery in Jan 2019 with fusion of several bone.Still has some pain every once in while.  She is doing very well.  Did get a flu shot this fall Continues on levothyroxine 75mcg/d  Did get Reclast in February 2020 and takes Vit D/Calcium  No new lymphadenopathy  No COVID sx no fever  No cough no loss of smell      PAST MEDICAL HISTORY:  See my note from 11/2019 .      SOCIAL HISTORY:  See my note from 11/2019.      FAMILY HISTORY:  See my note from 11/2019.      REVIEW OF SYSTEMS:  A 12-point review of " systems is done and is negative, except as in the HPI.     PHYSICAL EXAMINATION:  By the video, she is an alert woman, verbal, in no acute distress.     EYES:  Grossly normal to inspection, no discharge, erythema or icterus.   SKIN:  Visible skin is clear.  No significant rash or abnormal pigmentation.   NEUROLOGIC:  Cranial nerves seem to be intact.  Mentation and speech is appropriate for age.   PSYCHIATRIC:  Mentation appears normal.  He does not seem anxious today.     Results for ALDA GRIMES (MRN [id number]) as of 11/17/2020 11:44   Ref. Range 11/12/2020 18:15   Sodium Latest Ref Range: 133 - 144 mmol/L 142   Potassium Latest Ref Range: 3.4 - 5.3 mmol/L 4.0   Chloride Latest Ref Range: 94 - 109 mmol/L 109   Carbon Dioxide Latest Ref Range: 20 - 32 mmol/L 29   Urea Nitrogen Latest Ref Range: 7 - 30 mg/dL 18   Creatinine Latest Ref Range: 0.52 - 1.04 mg/dL 0.96   GFR Estimate Latest Ref Range: >60 mL/min/1.73_m2 60 (L)   GFR Estimate If Black Latest Ref Range: >60 mL/min/1.73_m2 69   Calcium Latest Ref Range: 8.5 - 10.1 mg/dL 9.7   Anion Gap Latest Ref Range: 3 - 14 mmol/L 4   Albumin Latest Ref Range: 3.4 - 5.0 g/dL 3.9   Protein Total Latest Ref Range: 6.8 - 8.8 g/dL 7.3   Bilirubin Total Latest Ref Range: 0.2 - 1.3 mg/dL 0.3   Alkaline Phosphatase Latest Ref Range: 40 - 150 U/L 99   ALT Latest Ref Range: 0 - 50 U/L 25   AST Latest Ref Range: 0 - 45 U/L 20   CRP Inflammation Latest Ref Range: 0.0 - 8.0 mg/L <2.9   Lactate Dehydrogenase Latest Ref Range: 81 - 234 U/L 188   TSH Latest Ref Range: 0.40 - 4.00 mU/L 2.04   Glucose Latest Ref Range: 70 - 99 mg/dL 85   WBC Latest Ref Range: 4.0 - 11.0 10e9/L 9.6   Hemoglobin Latest Ref Range: 11.7 - 15.7 g/dL 15.1   Hematocrit Latest Ref Range: 35.0 - 47.0 % 48.9 (H)   Platelet Count Latest Ref Range: 150 - 450 10e9/L 214   RBC Count Latest Ref Range: 3.8 - 5.2 10e12/L 5.05   MCV Latest Ref Range: 78 - 100 fl 97   MCH Latest Ref Range: 26.5 - 33.0 pg 29.9   MCHC  Latest Ref Range: 31.5 - 36.5 g/dL 30.9 (L)   RDW Latest Ref Range: 10.0 - 15.0 % 13.8   Diff Method Unknown Automated Method   % Neutrophils Latest Units: % 60.3   % Lymphocytes Latest Units: % 30.8   % Monocytes Latest Units: % 6.5   % Eosinophils Latest Units: % 1.5   % Basophils Latest Units: % 0.5   % Immature Granulocytes Latest Units: % 0.4   Nucleated RBCs Latest Ref Range: 0 /100 0   Absolute Neutrophil Latest Ref Range: 1.6 - 8.3 10e9/L 5.8   Absolute Lymphocytes Latest Ref Range: 0.8 - 5.3 10e9/L 3.0   Absolute Monocytes Latest Ref Range: 0.0 - 1.3 10e9/L 0.6   Absolute Eosinophils Latest Ref Range: 0.0 - 0.7 10e9/L 0.1   Absolute Basophils Latest Ref Range: 0.0 - 0.2 10e9/L 0.1   Abs Immature Granulocytes Latest Ref Range: 0 - 0.4 10e9/L 0.0   Absolute Nucleated RBC Unknown 0.0   Albumin Fraction Latest Ref Range: 3.7 - 5.1 g/dL 4.7   Alpha 1 Fraction Latest Ref Range: 0.2 - 0.4 g/dL 0.3   Alpha 2 Fraction Latest Ref Range: 0.5 - 0.9 g/dL 0.7   Beta Fraction Latest Ref Range: 0.6 - 1.0 g/dL 0.6   ELP Interpretation: Unknown Essentially byron...   Gamma Fraction Latest Ref Range: 0.7 - 1.6 g/dL 0.6 (L)   IGG Latest Ref Range: 610 - 1,616 mg/dL 624   Monoclonal Peak Latest Ref Range: 0.0 g/dL 0.0       ASSESSMENT:   1.  Diffuse large B-cell non-Hodgkin's lymphoma.   2.  Status post autologous peripheral blood stem cell transplant.   3.  Status post spine surgery.   4.  Status post radiation.   5.  Chronic sinusitis.     6.  Bilateral cataracts s/p surgery  7.  Hypogammaglobulinemia  8   Hypothyroidism  9.  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 Her sinusitus is better now and this year's IgG was 624 .No need for IgG replacement now.. She did get a flu shot this fall. Glad she is on bisphosphonates, would repeat reclast annually.I will  see her again in 1 year's time.  Again, I suggested if she should have a sinus infection that early antibiotics be used. She has a low IgA but has received platelets and IV IgG without complications The elevated hematocrit likely related to hydration    RTC 1 year with labs    Jeff Brantley MD  [phone]    Video-Visit Details    Type of service:  Video Visit    Video Start Time: 1135  Video End Time:  1155    Originating Location (pt. Location): Home    Distant Location (provider location):  [facility] BLOOD AND MARROW TRANSPLANT PROGRAM [place]     Platform used for Video Visit:  [employer]

## 2020-11-20 DIAGNOSIS — D80.1 HYPOGAMMAGLOBULINEMIA (H): ICD-10-CM

## 2020-11-20 DIAGNOSIS — E03.8 OTHER SPECIFIED HYPOTHYROIDISM: ICD-10-CM

## 2020-11-20 DIAGNOSIS — C85.99 EXTRANODAL LYMPHOMA (H): ICD-10-CM

## 2020-11-20 DIAGNOSIS — Z94.81 S/P BONE MARROW TRANSPLANT (H): ICD-10-CM

## 2020-11-20 RX ORDER — LORAZEPAM 0.5 MG/1
0.5 TABLET ORAL EVERY 6 HOURS PRN
Qty: 50 TABLET | Refills: 0 | Status: SHIPPED | OUTPATIENT
Start: 2020-11-20 | End: 2021-11-16

## 2021-01-15 ENCOUNTER — HEALTH MAINTENANCE LETTER (OUTPATIENT)
Age: 71
End: 2021-01-15

## 2021-09-04 ENCOUNTER — HEALTH MAINTENANCE LETTER (OUTPATIENT)
Age: 71
End: 2021-09-04

## 2021-11-03 DIAGNOSIS — D80.1 HYPOGAMMAGLOBULINEMIA (H): Primary | ICD-10-CM

## 2021-11-03 DIAGNOSIS — Z94.81 S/P BONE MARROW TRANSPLANT (H): ICD-10-CM

## 2021-11-12 ENCOUNTER — LAB (OUTPATIENT)
Dept: ONCOLOGY | Facility: CLINIC | Age: 71
End: 2021-11-12
Attending: INTERNAL MEDICINE
Payer: MEDICARE

## 2021-11-12 DIAGNOSIS — Z94.81 S/P BONE MARROW TRANSPLANT (H): ICD-10-CM

## 2021-11-12 DIAGNOSIS — D80.1 HYPOGAMMAGLOBULINEMIA (H): ICD-10-CM

## 2021-11-12 LAB
ALBUMIN SERPL-MCNC: 3.9 G/DL (ref 3.4–5)
ALP SERPL-CCNC: 84 U/L (ref 40–150)
ALT SERPL W P-5'-P-CCNC: 32 U/L (ref 0–50)
ANION GAP SERPL CALCULATED.3IONS-SCNC: 3 MMOL/L (ref 3–14)
AST SERPL W P-5'-P-CCNC: 24 U/L (ref 0–45)
BASOPHILS # BLD AUTO: 0.1 10E3/UL (ref 0–0.2)
BASOPHILS NFR BLD AUTO: 1 %
BILIRUB SERPL-MCNC: 0.5 MG/DL (ref 0.2–1.3)
BUN SERPL-MCNC: 17 MG/DL (ref 7–30)
CALCIUM SERPL-MCNC: 9.2 MG/DL (ref 8.5–10.1)
CHLORIDE BLD-SCNC: 111 MMOL/L (ref 94–109)
CO2 SERPL-SCNC: 28 MMOL/L (ref 20–32)
CREAT SERPL-MCNC: 0.94 MG/DL (ref 0.52–1.04)
CRP SERPL-MCNC: <2.9 MG/L (ref 0–8)
EOSINOPHIL # BLD AUTO: 0.2 10E3/UL (ref 0–0.7)
EOSINOPHIL NFR BLD AUTO: 2 %
ERYTHROCYTE [DISTWIDTH] IN BLOOD BY AUTOMATED COUNT: 13.7 % (ref 10–15)
GFR SERPL CREATININE-BSD FRML MDRD: 61 ML/MIN/1.73M2
GLUCOSE BLD-MCNC: 97 MG/DL (ref 70–99)
HCT VFR BLD AUTO: 46.9 % (ref 35–47)
HGB BLD-MCNC: 14.6 G/DL (ref 11.7–15.7)
IMM GRANULOCYTES # BLD: 0 10E3/UL
IMM GRANULOCYTES NFR BLD: 0 %
LDH SERPL L TO P-CCNC: 235 U/L (ref 81–234)
LYMPHOCYTES # BLD AUTO: 2.1 10E3/UL (ref 0.8–5.3)
LYMPHOCYTES NFR BLD AUTO: 24 %
MCH RBC QN AUTO: 30.4 PG (ref 26.5–33)
MCHC RBC AUTO-ENTMCNC: 31.1 G/DL (ref 31.5–36.5)
MCV RBC AUTO: 98 FL (ref 78–100)
MONOCYTES # BLD AUTO: 0.9 10E3/UL (ref 0–1.3)
MONOCYTES NFR BLD AUTO: 10 %
NEUTROPHILS # BLD AUTO: 5.4 10E3/UL (ref 1.6–8.3)
NEUTROPHILS NFR BLD AUTO: 63 %
NRBC # BLD AUTO: 0 10E3/UL
NRBC BLD AUTO-RTO: 0 /100
PLATELET # BLD AUTO: 199 10E3/UL (ref 150–450)
POTASSIUM BLD-SCNC: 4.1 MMOL/L (ref 3.4–5.3)
PROT SERPL-MCNC: 7.3 G/DL (ref 6.8–8.8)
RBC # BLD AUTO: 4.8 10E6/UL (ref 3.8–5.2)
SODIUM SERPL-SCNC: 142 MMOL/L (ref 133–144)
TOTAL PROTEIN SERUM FOR ELP: 6.8 G/DL (ref 6.8–8.8)
WBC # BLD AUTO: 8.6 10E3/UL (ref 4–11)

## 2021-11-12 PROCEDURE — 80053 COMPREHEN METABOLIC PANEL: CPT | Performed by: INTERNAL MEDICINE

## 2021-11-12 PROCEDURE — 82784 ASSAY IGA/IGD/IGG/IGM EACH: CPT | Performed by: INTERNAL MEDICINE

## 2021-11-12 PROCEDURE — 84155 ASSAY OF PROTEIN SERUM: CPT | Performed by: INTERNAL MEDICINE

## 2021-11-12 PROCEDURE — 86140 C-REACTIVE PROTEIN: CPT | Performed by: INTERNAL MEDICINE

## 2021-11-12 PROCEDURE — 83615 LACTATE (LD) (LDH) ENZYME: CPT | Performed by: INTERNAL MEDICINE

## 2021-11-12 PROCEDURE — 84165 PROTEIN E-PHORESIS SERUM: CPT | Mod: TC | Performed by: STUDENT IN AN ORGANIZED HEALTH CARE EDUCATION/TRAINING PROGRAM

## 2021-11-12 PROCEDURE — 36415 COLL VENOUS BLD VENIPUNCTURE: CPT

## 2021-11-12 PROCEDURE — 85025 COMPLETE CBC W/AUTO DIFF WBC: CPT | Performed by: INTERNAL MEDICINE

## 2021-11-12 NOTE — PROGRESS NOTES
Medical Assistant Note:  Marisa Bydr presents today for lab draw.    Patient seen by provider today: No.   present during visit today: Not Applicable.    Concerns: No Concerns.    Procedure:  Labs drawn: .    Post Assessment:  Labs drawn without difficulty: Yes.    Discharge Plan:  Departure Mode: Ambulatory.    Face to Face Time: 10 minutes.    Sallie Sosa, CMA

## 2021-11-15 LAB
ALBUMIN SERPL ELPH-MCNC: 4.4 G/DL (ref 3.7–5.1)
ALPHA1 GLOB SERPL ELPH-MCNC: 0.3 G/DL (ref 0.2–0.4)
ALPHA2 GLOB SERPL ELPH-MCNC: 0.8 G/DL (ref 0.5–0.9)
B-GLOBULIN SERPL ELPH-MCNC: 0.6 G/DL (ref 0.6–1)
GAMMA GLOB SERPL ELPH-MCNC: 0.7 G/DL (ref 0.7–1.6)
IGA SERPL-MCNC: <2 MG/DL (ref 84–499)
IGG SERPL-MCNC: 665 MG/DL (ref 610–1616)
IGM SERPL-MCNC: 81 MG/DL (ref 35–242)
M PROTEIN SERPL ELPH-MCNC: 0 G/DL
PROT PATTERN SERPL ELPH-IMP: NORMAL

## 2021-11-15 PROCEDURE — 84165 PROTEIN E-PHORESIS SERUM: CPT | Mod: 26 | Performed by: STUDENT IN AN ORGANIZED HEALTH CARE EDUCATION/TRAINING PROGRAM

## 2021-11-16 ENCOUNTER — VIRTUAL VISIT (OUTPATIENT)
Dept: TRANSPLANT | Facility: CLINIC | Age: 71
End: 2021-11-16
Attending: INTERNAL MEDICINE
Payer: MEDICARE

## 2021-11-16 DIAGNOSIS — E03.8 OTHER SPECIFIED HYPOTHYROIDISM: ICD-10-CM

## 2021-11-16 DIAGNOSIS — D80.1 HYPOGAMMAGLOBULINEMIA (H): ICD-10-CM

## 2021-11-16 DIAGNOSIS — Z94.81 S/P BONE MARROW TRANSPLANT (H): ICD-10-CM

## 2021-11-16 DIAGNOSIS — C85.99 EXTRANODAL LYMPHOMA (H): ICD-10-CM

## 2021-11-16 PROCEDURE — G0463 HOSPITAL OUTPT CLINIC VISIT: HCPCS | Mod: PN,RTG | Performed by: INTERNAL MEDICINE

## 2021-11-16 PROCEDURE — 99213 OFFICE O/P EST LOW 20 MIN: CPT | Mod: 95 | Performed by: INTERNAL MEDICINE

## 2021-11-16 RX ORDER — LORAZEPAM 0.5 MG/1
0.5 TABLET ORAL EVERY 6 HOURS PRN
Qty: 50 TABLET | Refills: 0 | Status: SHIPPED | OUTPATIENT
Start: 2021-11-16

## 2021-11-16 NOTE — PROGRESS NOTES
Alda is a 71 year old who is being evaluated via a billable video visit.      How would you like to obtain your AVS? MyChart  If the video visit is dropped, the invitation should be resent by: Send to e-mail at: [email or web address]  Will anyone else be joining your video visit? Yes,          BONE MARROW TRANSPLANT VISIT      Alda returns to the [facility] for evaluation of diffuse large B-cell lymphoma, now 13.5 years following an autologous peripheral blood stem cell transplant..  She is doing very well.  Did get a flu shot this fall Continues on levothyroxine 75mcg/d and simvastatin. She had parathyroid surgery in [facility] 2021 removing 3 /4 glands.She was asymptomatic. Now Ca++ is nomal  No new lymphadenopathy All in all feels good  No COVID sx no fever  No cough no loss of smell Had COVID vaccine Pfizer x 2 and booster. Got a flu shot      PAST MEDICAL HISTORY:  See my note from 11/2020 .      SOCIAL HISTORY:  See my note from 11/2020      FAMILY HISTORY:  See my note from 11/2020.      REVIEW OF SYSTEMS:  A 12-point review of systems is done and is negative, except as in the HPI.     PHYSICAL EXAMINATION:  By the video, she is an alert woman, verbal, in no acute distress.     EYES:  Grossly normal to inspection, no discharge, erythema or icterus.   SKIN:  Visible skin is clear.  No significant rash or abnormal pigmentation.   NEUROLOGIC:  Cranial nerves seem to be intact.  Mentation and speech is appropriate for age.   PSYCHIATRIC:  Mentation appears normal.  He does not seem anxious today.   Results for ALDA GRIMES (MRN [id number]) as of 11/16/2021 11:50   Ref. Range 11/12/2021 10:01   Sodium Latest Ref Range: 133 - 144 mmol/L 142   Potassium Latest Ref Range: 3.4 - 5.3 mmol/L 4.1   Chloride Latest Ref Range: 94 - 109 mmol/L 111 (H)   Carbon Dioxide Latest Ref Range: 20 - 32 mmol/L 28   Urea Nitrogen Latest Ref Range: 7 - 30 mg/dL 17   Creatinine Latest Ref Range: 0.52  - 1.04 mg/dL 0.94   GFR Estimate Latest Ref Range: >60 mL/min/1.73m2 61   Calcium Latest Ref Range: 8.5 - 10.1 mg/dL 9.2   Anion Gap Latest Ref Range: 3 - 14 mmol/L 3   Albumin Latest Ref Range: 3.4 - 5.0 g/dL 3.9   Protein Total Latest Ref Range: 6.8 - 8.8 g/dL 7.3   Bilirubin Total Latest Ref Range: 0.2 - 1.3 mg/dL 0.5   Alkaline Phosphatase Latest Ref Range: 40 - 150 U/L 84   ALT Latest Ref Range: 0 - 50 U/L 32   AST Latest Ref Range: 0 - 45 U/L 24   CRP Inflammation Latest Ref Range: 0.0 - 8.0 mg/L <2.9   Lactate Dehydrogenase Latest Ref Range: 81 - 234 U/L 235 (H)   Glucose Latest Ref Range: 70 - 99 mg/dL 97   WBC Latest Ref Range: 4.0 - 11.0 10e3/uL 8.6   Hemoglobin Latest Ref Range: 11.7 - 15.7 g/dL 14.6   Hematocrit Latest Ref Range: 35.0 - 47.0 % 46.9   Platelet Count Latest Ref Range: 150 - 450 10e3/uL 199   RBC Count Latest Ref Range: 3.80 - 5.20 10e6/uL 4.80   MCV Latest Ref Range: 78 - 100 fL 98   MCH Latest Ref Range: 26.5 - 33.0 pg 30.4   MCHC Latest Ref Range: 31.5 - 36.5 g/dL 31.1 (L)   RDW Latest Ref Range: 10.0 - 15.0 % 13.7   % Neutrophils Latest Units: % 63   % Lymphocytes Latest Units: % 24   % Monocytes Latest Units: % 10   % Eosinophils Latest Units: % 2   % Basophils Latest Units: % 1   Absolute Basophils Latest Ref Range: 0.0 - 0.2 10e3/uL 0.1   Absolute Eosinophils Latest Ref Range: 0.0 - 0.7 10e3/uL 0.2   Absolute Immature Granulocytes Latest Ref Range: <=0.0 10e3/uL 0.0   Absolute Lymphocytes Latest Ref Range: 0.8 - 5.3 10e3/uL 2.1   Absolute Monocytes Latest Ref Range: 0.0 - 1.3 10e3/uL 0.9   % Immature Granulocytes Latest Units: % 0   Absolute Neutrophils Latest Ref Range: 1.6 - 8.3 10e3/uL 5.4   Absolute NRBCs Latest Units: 10e3/uL 0.0   NRBCs per 100 WBC Latest Ref Range: <1 /100 0   Albumin Fraction Latest Ref Range: 3.7 - 5.1 g/dL 4.4   Alpha 1 Fraction Latest Ref Range: 0.2 - 0.4 g/dL 0.3   Alpha 2 Fraction Latest Ref Range: 0.5 - 0.9 g/dL 0.8   Beta Fraction Latest Ref Range: 0.6  - 1.0 g/dL 0.6   ELP Interpretation: Unknown Essentially normal electrophoretic pattern. No obvious monoclonal proteins seen. Pathologic signi...   Gamma Fraction Latest Ref Range: 0.7 - 1.6 g/dL 0.7   IGA Latest Ref Range: 84 - 499 mg/dL <2 (L)   IGG Latest Ref Range: 610-1,616 mg/dL 665   IGM Latest Ref Range: 35 - 242 mg/dL 81   Monoclonal Peak Latest Ref Range: <=0.0 g/dL 0.0   Total Protein Serum for ELP Latest Ref Range: 6.8 - 8.8 g/dL 6.8         ASSESSMENT:   1.  Diffuse large B-cell non-Hodgkin's lymphoma.   2.  Status post autologous peripheral blood stem cell transplant.   3.  Status post spine surgery.   4.  Status post radiation.   5.  Chronic sinusitis.     6.  Bilateral cataracts s/p surgery  7.  Hypogammaglobulinemia  8   Hypothyroidism  9.  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 Hyperparathyroidism,s/p parathyroidectomy     Marisa is doing well 13-1/2 years following an autologous peripheral blood stem cell transplant and remains in complete remission and Karnofsky 100. Her sinusitus is better now and this year's IgG was 665 .No need for IgG replacement now.. She did get a flu shot and booster of COVID vaccine this fall. Not sure about the hyperparthyroidism.Tolerated surgery well  No need for followup 13.5 years post       RTC PRN    Jeff Brantley MD  [phone]    Video-Visit Details    Type of service:  Video Visit    Video Start Time: 1135  Video End Time:  1155    Originating Location (pt. Location): Home    Distant Location (provider location):  [facility] BLOOD AND MARROW TRANSPLANT PROGRAM [place]     Platform used for Video Visit:  [employer]

## 2021-11-16 NOTE — LETTER
11/16/2021         RE: Marisa Grimes  [id number] [facility] [id number]        Dear Colleague,    Thank you for referring your patient, Marisa Grimes, to the [facility]. Please see a copy of my visit note below.    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  She is doing very well.  Did get a flu shot this fall Continues on levothyroxine 75mcg/d and simvastatin. She had parathyroid surgery in [facility] 2021 removing 3 /4 glands.She was asymptomatic. Now Ca++ is nomal  No new lymphadenopathy All in all feels good  No COVID sx no fever  No cough no loss of smell Had COVID vaccine Pfizer x 2 and booster. Got a flu shot      PAST MEDICAL HISTORY:  See my note from 11/2020 .      SOCIAL HISTORY:  See my note from 11/2020      FAMILY HISTORY:  See my note from 11/2020.      REVIEW OF SYSTEMS:  A 12-point review of systems is done and is negative, except as in the HPI.     PHYSICAL EXAMINATION:  By the video, she is an alert woman, verbal, in no acute distress.     EYES:  Grossly normal to inspection, no discharge, erythema or icterus.   SKIN:  Visible skin is clear.  No significant rash or abnormal pigmentation.   NEUROLOGIC:  Cranial nerves seem to be intact.  Mentation and speech is appropriate for age.   PSYCHIATRIC:  Mentation appears normal.  He does not seem anxious today.   Results for MARISA GRIMES (MRN [id number]) as of 11/16/2021 11:50   Ref. Range 11/12/2021 10:01   Sodium Latest Ref Range: 133 - 144 mmol/L 142   Potassium Latest Ref Range: 3.4 - 5.3 mmol/L 4.1   Chloride Latest Ref Range: 94 - 109 mmol/L 111 (H)   Carbon Dioxide Latest Ref Range: 20 - 32 mmol/L 28   Urea Nitrogen Latest Ref Range: 7 - 30 mg/dL 17   Creatinine Latest Ref Range: 0.52 - 1.04  mg/dL 0.94   GFR Estimate Latest Ref Range: >60 mL/min/1.73m2 61   Calcium Latest Ref Range: 8.5 - 10.1 mg/dL 9.2   Anion Gap Latest Ref Range: 3 - 14 mmol/L 3   Albumin Latest Ref Range: 3.4 - 5.0 g/dL 3.9   Protein Total Latest Ref Range: 6.8 - 8.8 g/dL 7.3   Bilirubin Total Latest Ref Range: 0.2 - 1.3 mg/dL 0.5   Alkaline Phosphatase Latest Ref Range: 40 - 150 U/L 84   ALT Latest Ref Range: 0 - 50 U/L 32   AST Latest Ref Range: 0 - 45 U/L 24   CRP Inflammation Latest Ref Range: 0.0 - 8.0 mg/L <2.9   Lactate Dehydrogenase Latest Ref Range: 81 - 234 U/L 235 (H)   Glucose Latest Ref Range: 70 - 99 mg/dL 97   WBC Latest Ref Range: 4.0 - 11.0 10e3/uL 8.6   Hemoglobin Latest Ref Range: 11.7 - 15.7 g/dL 14.6   Hematocrit Latest Ref Range: 35.0 - 47.0 % 46.9   Platelet Count Latest Ref Range: 150 - 450 10e3/uL 199   RBC Count Latest Ref Range: 3.80 - 5.20 10e6/uL 4.80   MCV Latest Ref Range: 78 - 100 fL 98   MCH Latest Ref Range: 26.5 - 33.0 pg 30.4   MCHC Latest Ref Range: 31.5 - 36.5 g/dL 31.1 (L)   RDW Latest Ref Range: 10.0 - 15.0 % 13.7   % Neutrophils Latest Units: % 63   % Lymphocytes Latest Units: % 24   % Monocytes Latest Units: % 10   % Eosinophils Latest Units: % 2   % Basophils Latest Units: % 1   Absolute Basophils Latest Ref Range: 0.0 - 0.2 10e3/uL 0.1   Absolute Eosinophils Latest Ref Range: 0.0 - 0.7 10e3/uL 0.2   Absolute Immature Granulocytes Latest Ref Range: <=0.0 10e3/uL 0.0   Absolute Lymphocytes Latest Ref Range: 0.8 - 5.3 10e3/uL 2.1   Absolute Monocytes Latest Ref Range: 0.0 - 1.3 10e3/uL 0.9   % Immature Granulocytes Latest Units: % 0   Absolute Neutrophils Latest Ref Range: 1.6 - 8.3 10e3/uL 5.4   Absolute NRBCs Latest Units: 10e3/uL 0.0   NRBCs per 100 WBC Latest Ref Range: <1 /100 0   Albumin Fraction Latest Ref Range: 3.7 - 5.1 g/dL 4.4   Alpha 1 Fraction Latest Ref Range: 0.2 - 0.4 g/dL 0.3   Alpha 2 Fraction Latest Ref Range: 0.5 - 0.9 g/dL 0.8   Beta Fraction Latest Ref Range: 0.6 - 1.0  g/dL 0.6   ELP Interpretation: Unknown Essentially normal electrophoretic pattern. No obvious monoclonal proteins seen. Pathologic signi...   Gamma Fraction Latest Ref Range: 0.7 - 1.6 g/dL 0.7   IGA Latest Ref Range: 84 - 499 mg/dL <2 (L)   IGG Latest Ref Range: 610-1,616 mg/dL 665   IGM Latest Ref Range: 35 - 242 mg/dL 81   Monoclonal Peak Latest Ref Range: <=0.0 g/dL 0.0   Total Protein Serum for ELP Latest Ref Range: 6.8 - 8.8 g/dL 6.8         ASSESSMENT:   1.  Diffuse large B-cell non-Hodgkin's lymphoma.   2.  Status post autologous peripheral blood stem cell transplant.   3.  Status post spine surgery.   4.  Status post radiation.   5.  Chronic sinusitis.     6.  Bilateral cataracts s/p surgery  7.  Hypogammaglobulinemia  8   Hypothyroidism  9.  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 Hyperparathyroidism,s/p parathyroidectomy     Marisa is doing well 13-1/2 years following an autologous peripheral blood stem cell transplant and remains in complete remission and Karnofsky 100. Her sinusitus is better now and this year's IgG was 665 .No need for IgG replacement now.. She did get a flu shot and booster of COVID vaccine this fall. Not sure about the hyperparthyroidism.Tolerated surgery well  No need for followup 13.5 years post       RTC PRN    Jeff Brantley MD  [phone]

## 2022-02-13 ENCOUNTER — HEALTH MAINTENANCE LETTER (OUTPATIENT)
Age: 72
End: 2022-02-13

## 2022-10-16 ENCOUNTER — HEALTH MAINTENANCE LETTER (OUTPATIENT)
Age: 72
End: 2022-10-16

## 2023-03-26 ENCOUNTER — HEALTH MAINTENANCE LETTER (OUTPATIENT)
Age: 73
End: 2023-03-26

## 2024-06-01 ENCOUNTER — HEALTH MAINTENANCE LETTER (OUTPATIENT)
Age: 74
End: 2024-06-01

## 2024-10-19 ENCOUNTER — HEALTH MAINTENANCE LETTER (OUTPATIENT)
Age: 74
End: 2024-10-19